# Patient Record
Sex: FEMALE | Race: WHITE | Employment: OTHER | URBAN - METROPOLITAN AREA
[De-identification: names, ages, dates, MRNs, and addresses within clinical notes are randomized per-mention and may not be internally consistent; named-entity substitution may affect disease eponyms.]

---

## 2017-05-25 PROBLEM — M79.2 NEUROPATHIC PAIN: Chronic | Status: ACTIVE | Noted: 2017-05-25

## 2017-05-25 PROBLEM — G89.29 CHRONIC PAIN: Chronic | Status: ACTIVE | Noted: 2017-05-25

## 2017-05-25 PROBLEM — M48.02 NEURAL FORAMINAL STENOSIS OF CERVICAL SPINE: Chronic | Status: ACTIVE | Noted: 2017-05-25

## 2017-05-25 PROBLEM — M40.203 CERVICOTHORACIC KYPHOSIS: Chronic | Status: ACTIVE | Noted: 2017-05-25

## 2018-03-23 ENCOUNTER — TELEPHONE (OUTPATIENT)
Dept: NON INVASIVE DIAGNOSTICS | Age: 83
End: 2018-03-23

## 2018-04-17 ENCOUNTER — TELEPHONE (OUTPATIENT)
Dept: NON INVASIVE DIAGNOSTICS | Age: 83
End: 2018-04-17

## 2018-11-22 ENCOUNTER — HOSPITAL ENCOUNTER (INPATIENT)
Age: 83
LOS: 3 days | Discharge: HOME | DRG: 291 | End: 2018-11-25
Attending: INTERNAL MEDICINE | Admitting: INTERNAL MEDICINE
Payer: MEDICARE

## 2018-11-22 ENCOUNTER — APPOINTMENT (OUTPATIENT)
Dept: GENERAL RADIOLOGY | Age: 83
DRG: 291 | End: 2018-11-22
Attending: INTERNAL MEDICINE
Payer: MEDICARE

## 2018-11-22 PROBLEM — I50.43 CHF (CONGESTIVE HEART FAILURE), NYHA CLASS I, ACUTE ON CHRONIC, COMBINED (HCC): Status: ACTIVE | Noted: 2018-11-22

## 2018-11-22 LAB
ANION GAP SERPL CALCULATED.3IONS-SCNC: 18 MMOL/L (ref 7–16)
BACTERIA: NORMAL /HPF
BASOPHILS ABSOLUTE: 0 E9/L (ref 0–0.2)
BASOPHILS RELATIVE PERCENT: 0.2 % (ref 0–2)
BILIRUBIN URINE: NEGATIVE
BLOOD, URINE: ABNORMAL
BUN BLDV-MCNC: 41 MG/DL (ref 8–23)
CALCIUM SERPL-MCNC: 8.2 MG/DL (ref 8.6–10.2)
CHLORIDE BLD-SCNC: 96 MMOL/L (ref 98–107)
CLARITY: CLEAR
CO2: 18 MMOL/L (ref 22–29)
COLOR: YELLOW
CREAT SERPL-MCNC: 2.1 MG/DL (ref 0.5–1)
DOHLE BODIES: ABNORMAL
EOSINOPHILS ABSOLUTE: 0 E9/L (ref 0.05–0.5)
EOSINOPHILS RELATIVE PERCENT: 0 % (ref 0–6)
GFR AFRICAN AMERICAN: 27
GFR NON-AFRICAN AMERICAN: 22 ML/MIN/1.73
GLUCOSE BLD-MCNC: 178 MG/DL (ref 74–99)
GLUCOSE URINE: NEGATIVE MG/DL
HCT VFR BLD CALC: 35.9 % (ref 34–48)
HEMOGLOBIN: 11.9 G/DL (ref 11.5–15.5)
KETONES, URINE: NEGATIVE MG/DL
LEUKOCYTE ESTERASE, URINE: ABNORMAL
LYMPHOCYTES ABSOLUTE: 0.87 E9/L (ref 1.5–4)
LYMPHOCYTES RELATIVE PERCENT: 2.6 % (ref 20–42)
MCH RBC QN AUTO: 30.6 PG (ref 26–35)
MCHC RBC AUTO-ENTMCNC: 33.1 % (ref 32–34.5)
MCV RBC AUTO: 92.3 FL (ref 80–99.9)
METAMYELOCYTES RELATIVE PERCENT: 0.9 % (ref 0–1)
MONOCYTES ABSOLUTE: 0.29 E9/L (ref 0.1–0.95)
MONOCYTES RELATIVE PERCENT: 0.9 % (ref 2–12)
NEUTROPHILS ABSOLUTE: 28.03 E9/L (ref 1.8–7.3)
NEUTROPHILS RELATIVE PERCENT: 95.6 % (ref 43–80)
NITRITE, URINE: NEGATIVE
PDW BLD-RTO: 13.6 FL (ref 11.5–15)
PH UA: 5.5 (ref 5–9)
PLATELET # BLD: 193 E9/L (ref 130–450)
PMV BLD AUTO: 10.4 FL (ref 7–12)
POTASSIUM SERPL-SCNC: 4.9 MMOL/L (ref 3.5–5)
PRO-BNP: ABNORMAL PG/ML (ref 0–450)
PROTEIN UA: ABNORMAL MG/DL
RBC # BLD: 3.89 E12/L (ref 3.5–5.5)
RBC UA: NORMAL /HPF (ref 0–2)
SODIUM BLD-SCNC: 132 MMOL/L (ref 132–146)
SPECIFIC GRAVITY UA: 1.01 (ref 1–1.03)
UROBILINOGEN, URINE: 0.2 E.U./DL
WBC # BLD: 28.9 E9/L (ref 4.5–11.5)
WBC UA: NORMAL /HPF (ref 0–5)

## 2018-11-22 PROCEDURE — 85025 COMPLETE CBC W/AUTO DIFF WBC: CPT

## 2018-11-22 PROCEDURE — 81001 URINALYSIS AUTO W/SCOPE: CPT

## 2018-11-22 PROCEDURE — 6370000000 HC RX 637 (ALT 250 FOR IP): Performed by: FAMILY MEDICINE

## 2018-11-22 PROCEDURE — 2700000000 HC OXYGEN THERAPY PER DAY

## 2018-11-22 PROCEDURE — 87040 BLOOD CULTURE FOR BACTERIA: CPT

## 2018-11-22 PROCEDURE — 36415 COLL VENOUS BLD VENIPUNCTURE: CPT

## 2018-11-22 PROCEDURE — 87088 URINE BACTERIA CULTURE: CPT

## 2018-11-22 PROCEDURE — 83880 ASSAY OF NATRIURETIC PEPTIDE: CPT

## 2018-11-22 PROCEDURE — 71046 X-RAY EXAM CHEST 2 VIEWS: CPT

## 2018-11-22 PROCEDURE — 84145 PROCALCITONIN (PCT): CPT

## 2018-11-22 PROCEDURE — 1200000000 HC SEMI PRIVATE

## 2018-11-22 PROCEDURE — 80048 BASIC METABOLIC PNL TOTAL CA: CPT

## 2018-11-22 RX ORDER — IPRATROPIUM BROMIDE AND ALBUTEROL SULFATE 2.5; .5 MG/3ML; MG/3ML
1 SOLUTION RESPIRATORY (INHALATION)
Status: DISCONTINUED | OUTPATIENT
Start: 2018-11-22 | End: 2018-11-25 | Stop reason: HOSPADM

## 2018-11-22 RX ORDER — ZOLPIDEM TARTRATE 5 MG/1
5 TABLET ORAL NIGHTLY PRN
Status: DISCONTINUED | OUTPATIENT
Start: 2018-11-22 | End: 2018-11-25 | Stop reason: HOSPADM

## 2018-11-22 RX ORDER — SIMVASTATIN 10 MG
10 TABLET ORAL DAILY
Status: DISCONTINUED | OUTPATIENT
Start: 2018-11-23 | End: 2018-11-25 | Stop reason: HOSPADM

## 2018-11-22 RX ORDER — ESOMEPRAZOLE MAGNESIUM 20 MG/1
20 FOR SUSPENSION ORAL DAILY
COMMUNITY

## 2018-11-22 RX ORDER — PREGABALIN 50 MG/1
50 CAPSULE ORAL 3 TIMES DAILY
Status: DISCONTINUED | OUTPATIENT
Start: 2018-11-22 | End: 2018-11-25 | Stop reason: HOSPADM

## 2018-11-22 RX ORDER — ACETAMINOPHEN 325 MG/1
650 TABLET ORAL EVERY 4 HOURS PRN
Status: DISCONTINUED | OUTPATIENT
Start: 2018-11-22 | End: 2018-11-25 | Stop reason: HOSPADM

## 2018-11-22 RX ORDER — PREGABALIN 50 MG/1
50 CAPSULE ORAL 3 TIMES DAILY
COMMUNITY

## 2018-11-22 RX ORDER — LEVOTHYROXINE SODIUM 88 UG/1
88 TABLET ORAL DAILY
Status: DISCONTINUED | OUTPATIENT
Start: 2018-11-23 | End: 2018-11-25 | Stop reason: HOSPADM

## 2018-11-22 RX ORDER — LANOLIN ALCOHOL/MO/W.PET/CERES
3 CREAM (GRAM) TOPICAL NIGHTLY PRN
Status: DISCONTINUED | OUTPATIENT
Start: 2018-11-22 | End: 2018-11-25 | Stop reason: HOSPADM

## 2018-11-22 RX ORDER — ESOMEPRAZOLE MAGNESIUM 20 MG/1
20 FOR SUSPENSION ORAL DAILY
Status: DISCONTINUED | OUTPATIENT
Start: 2018-11-23 | End: 2018-11-25 | Stop reason: HOSPADM

## 2018-11-22 RX ADMIN — PREGABALIN 50 MG: 50 CAPSULE ORAL at 20:39

## 2018-11-22 RX ADMIN — ZOLPIDEM TARTRATE 5 MG: 5 TABLET ORAL at 20:38

## 2018-11-22 ASSESSMENT — PAIN SCALES - GENERAL
PAINLEVEL_OUTOF10: 0

## 2018-11-22 NOTE — H&P
INTERNAL MEDICINE H&P  2018  Name:  Tanika Grover  :   1935 (5 y.o.)  MRN:   [id number]    No chief complaint on file. HPI:  The patient is a 80 y.o. female presents with generalized weakness, SOB and fatigue which has been present for the past 3 days. She lives at home with her daughter and states that she has been spending most of the day in the bed. She admits to arthritic pain. She denies any chest pain, abdominal pain, nausea, vomiting, fever, chills, dysuria, diarrhea, recent antibiotic use, recent steroid use. She does not use home oxygen. She cannot give me very many details as to why she came to the hospital.    She smokes 1 PPD. ECHO 2016   Limited study ordered:   Left ventricular size is grossly normal.   Moderate left ventricular concentric hypertrophy noted.   Abnormal (paradoxical) motion consistent with conduction abnormality.   Ejection fraction is visually estimated at 35 to 40%. LAST DISCHARGE DIAGNOSES 10/20/2016:   1. Acute systolic congestive heart failure   2. Chronic kidney disease stage 3   3. COPD   4. Hypertension   5. Hypothyroidism   6. Peripheral vascular disease   7.     Anemia of chronic disease    Past Medical History:    Past Medical History:   Diagnosis Date    CKD (chronic kidney disease), stage III     Complete heart block (HCC)     [facility] [address]     Depression     Hypertension     Hypothyroidism 2012    Left ventricular systolic dysfunction     PVD (peripheral vascular disease) with claudication ([facility] [address] 2014    RBBB (right bundle branch block with left anterior fascicular block)     Valvular heart disease        Past Surgical History:   Past Surgical History:   Procedure Laterality Date    CARDIAC CATHETERIZATION  09/10/2012    Dr. Roman Big EF 50%    COLONOSCOPY      CORONARY ANGIOPLASTY WITH STENT PLACEMENT  09/10/2012    Dr. Nay More LIZZY to PDA x1    ECHO COMPL W DOP COLOR FLOW  9/8/2012         ENDOSCOPY, COLON, DIAGNOSTIC      OTHER SURGICAL HISTORY  12/16/15    PGR by Dr. Jenny Tuttle  12/16/2015    TUBAL LIGATION  1963       Social History:  Social History     Social History    Marital status:      Spouse name: N/A    Number of children: N/A    Years of education: N/A     Social History Main Topics    Smoking status: Current Every Day Smoker     Packs/day: 1.00     Years: 25.00     Types: Cigarettes    Smokeless tobacco: Never Used    Alcohol use No    Drug use: No    Sexual activity: Not on file     Other Topics Concern    Not on file     Social History Narrative    No narrative on file       Family History:   Family History   Problem Relation Age of Onset    Cancer Mother         uterine    No Known Problems Father        Allergies:    No Known Allergies    Medications Prior to Admission:   Prior to Admission medications    Medication Sig Start Date End Date Taking? Authorizing Provider   pregabalin (LYRICA) 75 MG capsule Take 1 capsule by mouth 3 times daily 6/20/17   ZAHRAA Anderson CNP   lidocaine (XYLOCAINE) 5 % ointment Apply topically as needed.  5/25/17   Wil Werner,    isosorbide mononitrate (IMDUR) 30 MG extended release tablet Take 1 tablet by mouth daily 10/20/16   Nereida Plaster, DO   carvedilol (COREG) 3.125 MG tablet Take 1 tablet by mouth 2 times daily (with meals) 10/20/16   Nereida Plaster, DO   spironolactone (ALDACTONE) 25 MG tablet Take 1 tablet by mouth daily 10/20/16   Nereida Plaster, DO   zolpidem (AMBIEN) 5 MG tablet Take 1 tablet by mouth at bedtime 9/9/16   Historical Provider, MD   furosemide (LASIX) 40 MG tablet Take 1 tablet by mouth 2 times daily 10/4/16   ZAHRAA Diez CNP   cyanocobalamin 1000 MCG/ML injection Inject 1,000 mcg into the muscle every 30 days     Historical Provider, MD   gabapentin (NEURONTIN) 300 MG capsule Take 1 capsule by mouth nightly  Patient taking differently: Take 300 mg by mouth 3 times daily  9/15/15   Juan Pablo Bravo MD   simvastatin (ZOCOR) 10 MG tablet Take 10 mg by mouth nightly  7/8/15   Historical Provider, MD   levothyroxine (SYNTHROID) 88 MCG tablet Take 88 mcg by mouth  3/27/15   Historical Provider, MD   ADVAIR DISKUS 250-50 MCG/DOSE AEPB Inhale 1 puff into the lungs as needed  4/2/13   Historical Provider, MD   NITROSTAT 0.4 MG SL tablet Place 0.4 mg under the tongue every 5 minutes as needed. 1/3/13   Historical Provider, MD       REVIEW OF SYSTEMS:  General ROS: positive for  - fatigue  Hematological and Lymphatic ROS: negative  Respiratory ROS: positive for - shortness of breath  Cardiovascular ROS: positive for - shortness of breath  Gastrointestinal ROS: negative  Genito-Urinary ROS: negative  Musculoskeletal ROS: negative  ENT ROS: negative  Endocrine ROS: negative  Dermatological ROS: negative    PHYSICAL EXAM:  There were no vitals filed for this visit. General Appearance:  awake, alert, oriented, in no acute distress  Skin:  Skin color, texture, turgor normal. No rashes or lesions. Head/face:  NCAT  Eyes:  No gross abnormalities.   Lungs:  3L NC, no cough, CTAB, mild bilateral rhonchi, mild exp wheezes  Heart: pacer, RR, no JVD, +peripheral edema  Abdomen:  S, NT, ND  Extremities: BLE edema, no erythema, non tender  Neurologic: CNs grossly intact, no slurred speech, alert, muffled speech  Musculoskeletal: good ROM, no acute deformities      CBC:   Lab Results   Component Value Date    WBC 7.0 10/20/2016    RBC 3.92 10/20/2016    HGB 9.6 10/20/2016    HCT 30.0 10/20/2016    MCV 76.6 10/20/2016    MCH 24.4 10/20/2016    MCHC 31.9 10/20/2016    RDW 17.8 10/20/2016     10/20/2016    MPV 7.8 10/20/2016     BMP:   Lab Results   Component Value Date     10/20/2016    K 4.0 10/20/2016    CL 98 10/20/2016    CO2 28 10/20/2016    BUN 34 10/20/2016    LABALBU 3.8 10/20/2016    LABALBU 4.1 07/31/2011    CREATININE 1.2 10/20/2016 CALCIUM 9.4 10/20/2016    GFRAA 52 10/20/2016    LABGLOM 43 10/20/2016    GLUCOSE 138 10/20/2016    GLUCOSE 197 07/31/2011     Hepatic Function Panel:    Lab Results   Component Value Date    ALKPHOS 68 10/20/2016    AST 20 10/20/2016    ALT 19 10/20/2016    PROT 6.6 10/20/2016    LABALBU 3.8 10/20/2016    LABALBU 4.1 07/31/2011    BILITOT 0.2 10/20/2016     PT/INR:   Lab Results   Component Value Date    PROTIME 11.3 09/26/2012    INR 1.2 09/26/2012     U/A:   Lab Results   Component Value Date    LEUKOCYTESUR TRACE 02/05/2016    PHUR 6.0 02/05/2016    WBCUA 0-1 02/05/2016    WBCUA 0-1 07/30/2011    RBCUA NONE 02/05/2016    RBCUA NONE 07/30/2011    BACTERIA MODERATE 02/05/2016    SPECGRAV 1.010 02/05/2016    BLOODU Negative 02/05/2016    GLUCOSEU Negative 02/05/2016    GLUCOSEU NEGATIVE 07/30/2011     TSH:    Lab Results   Component Value Date    TSH 0.815 10/18/2016     Xr Chest Standard (2 Vw)    Result Date: 11/22/2018  LOCATION: 200 EXAM: XR CHEST (2 VW) COMPARISON: None HISTORY: Shortness of breath TECHNIQUE: PA and lateral  views of the chest were obtained. FINDINGS:  SUPPORT DEVICES: None. LUNGS: Mildly prominent interstitial markings identified. PLEURA: No effusions or pneumothorax. LUNG VOLUMES: Satisfactory inspirator effort. MEDIASTINAL STRUCTURES: No lymphadenopathy. Normal aortic contour. HEART SIZE: Enlarged. UPPER ABDOMEN: Unremarkable. BONES AND SOFT TISSUES: No fracture or soft tissue abnormality. Prominent interstitial markings likely interstitial edema. Assessment and Plan:  Acute on chronic systolic congestive heart failure  Chronic kidney disease stage III  COPD the  Hypertension  Hypothyroidism  Peripheral vascular disease  Anemia of chronic disease        Direct admitted to telemetry  Labs now and chest x-ray now  Morning labs  Cardiac diet  Up With assistance  PT, OT  Flutter  Home medications reviewed  DVT/GI prophylaxis  Will discuss with Attending Physician.     Shyann Kearns Synthia Lombard, DO 5:13 PM, 11/22/2018     I discussed the patient's management with the resident. I reviewed the resident's note and agree with the documented findings and plan of care.     Jarocho Emery D.O., [facility]  11/22/2018  6:00 PM

## 2018-11-23 PROBLEM — I50.21 ACUTE SYSTOLIC CHF (CONGESTIVE HEART FAILURE) (HCC): Status: ACTIVE | Noted: 2018-11-23

## 2018-11-23 LAB
ACANTHOCYTES: ABNORMAL
ALBUMIN SERPL-MCNC: 3.3 G/DL (ref 3.5–5.2)
ALP BLD-CCNC: 55 U/L (ref 35–104)
ALT SERPL-CCNC: 18 U/L (ref 0–32)
ANION GAP SERPL CALCULATED.3IONS-SCNC: 18 MMOL/L (ref 7–16)
AST SERPL-CCNC: 15 U/L (ref 0–31)
BASOPHILS ABSOLUTE: 0 E9/L (ref 0–0.2)
BASOPHILS RELATIVE PERCENT: 0.4 % (ref 0–2)
BILIRUB SERPL-MCNC: 0.3 MG/DL (ref 0–1.2)
BUN BLDV-MCNC: 45 MG/DL (ref 8–23)
BURR CELLS: ABNORMAL
CALCIUM SERPL-MCNC: 8.4 MG/DL (ref 8.6–10.2)
CHLORIDE BLD-SCNC: 98 MMOL/L (ref 98–107)
CHOLESTEROL, TOTAL: 158 MG/DL (ref 0–199)
CO2: 19 MMOL/L (ref 22–29)
CREAT SERPL-MCNC: 1.9 MG/DL (ref 0.5–1)
EOSINOPHILS ABSOLUTE: 0 E9/L (ref 0.05–0.5)
EOSINOPHILS RELATIVE PERCENT: 0 % (ref 0–6)
GFR AFRICAN AMERICAN: 31
GFR NON-AFRICAN AMERICAN: 25 ML/MIN/1.73
GLUCOSE BLD-MCNC: 164 MG/DL (ref 74–99)
HCT VFR BLD CALC: 37 % (ref 34–48)
HDLC SERPL-MCNC: 38 MG/DL
HEMOGLOBIN: 11.9 G/DL (ref 11.5–15.5)
LDL CHOLESTEROL CALCULATED: 93 MG/DL (ref 0–99)
LYMPHOCYTES ABSOLUTE: 0.84 E9/L (ref 1.5–4)
LYMPHOCYTES RELATIVE PERCENT: 2.6 % (ref 20–42)
MCH RBC QN AUTO: 30.2 PG (ref 26–35)
MCHC RBC AUTO-ENTMCNC: 32.2 % (ref 32–34.5)
MCV RBC AUTO: 93.9 FL (ref 80–99.9)
MONOCYTES ABSOLUTE: 0.28 E9/L (ref 0.1–0.95)
MONOCYTES RELATIVE PERCENT: 0.9 % (ref 2–12)
NEUTROPHILS ABSOLUTE: 27.26 E9/L (ref 1.8–7.3)
NEUTROPHILS RELATIVE PERCENT: 96.5 % (ref 43–80)
PDW BLD-RTO: 13.7 FL (ref 11.5–15)
PLATELET # BLD: 200 E9/L (ref 130–450)
PMV BLD AUTO: 10.8 FL (ref 7–12)
POIKILOCYTES: ABNORMAL
POTASSIUM SERPL-SCNC: 4.5 MMOL/L (ref 3.5–5)
PROCALCITONIN: 7.34 NG/ML (ref 0–0.08)
RBC # BLD: 3.94 E12/L (ref 3.5–5.5)
SODIUM BLD-SCNC: 135 MMOL/L (ref 132–146)
TOTAL PROTEIN: 6.8 G/DL (ref 6.4–8.3)
TRIGL SERPL-MCNC: 134 MG/DL (ref 0–149)
TSH SERPL DL<=0.05 MIU/L-ACNC: 0.39 UIU/ML (ref 0.27–4.2)
VLDLC SERPL CALC-MCNC: 27 MG/DL
WBC # BLD: 28.1 E9/L (ref 4.5–11.5)

## 2018-11-23 PROCEDURE — 80053 COMPREHEN METABOLIC PANEL: CPT

## 2018-11-23 PROCEDURE — 97161 PT EVAL LOW COMPLEX 20 MIN: CPT | Performed by: PHYSICAL THERAPIST

## 2018-11-23 PROCEDURE — 80061 LIPID PANEL: CPT

## 2018-11-23 PROCEDURE — 6370000000 HC RX 637 (ALT 250 FOR IP): Performed by: FAMILY MEDICINE

## 2018-11-23 PROCEDURE — 2580000003 HC RX 258: Performed by: INTERNAL MEDICINE

## 2018-11-23 PROCEDURE — 97535 SELF CARE MNGMENT TRAINING: CPT

## 2018-11-23 PROCEDURE — G8987 SELF CARE CURRENT STATUS: HCPCS

## 2018-11-23 PROCEDURE — 1200000000 HC SEMI PRIVATE

## 2018-11-23 PROCEDURE — 6360000002 HC RX W HCPCS: Performed by: INTERNAL MEDICINE

## 2018-11-23 PROCEDURE — 2700000000 HC OXYGEN THERAPY PER DAY

## 2018-11-23 PROCEDURE — 97166 OT EVAL MOD COMPLEX 45 MIN: CPT

## 2018-11-23 PROCEDURE — G8988 SELF CARE GOAL STATUS: HCPCS

## 2018-11-23 PROCEDURE — 97530 THERAPEUTIC ACTIVITIES: CPT | Performed by: PHYSICAL THERAPIST

## 2018-11-23 PROCEDURE — 85025 COMPLETE CBC W/AUTO DIFF WBC: CPT

## 2018-11-23 PROCEDURE — 36415 COLL VENOUS BLD VENIPUNCTURE: CPT

## 2018-11-23 PROCEDURE — G8978 MOBILITY CURRENT STATUS: HCPCS | Performed by: PHYSICAL THERAPIST

## 2018-11-23 PROCEDURE — 94640 AIRWAY INHALATION TREATMENT: CPT

## 2018-11-23 PROCEDURE — G8979 MOBILITY GOAL STATUS: HCPCS | Performed by: PHYSICAL THERAPIST

## 2018-11-23 PROCEDURE — 84443 ASSAY THYROID STIM HORMONE: CPT

## 2018-11-23 RX ADMIN — ACETAMINOPHEN 650 MG: 325 TABLET, FILM COATED ORAL at 17:28

## 2018-11-23 RX ADMIN — PREGABALIN 50 MG: 50 CAPSULE ORAL at 20:37

## 2018-11-23 RX ADMIN — PREGABALIN 50 MG: 50 CAPSULE ORAL at 15:03

## 2018-11-23 RX ADMIN — IPRATROPIUM BROMIDE AND ALBUTEROL SULFATE 1 AMPULE: .5; 3 SOLUTION RESPIRATORY (INHALATION) at 20:03

## 2018-11-23 RX ADMIN — ESOMEPRAZOLE MAGNESIUM 20 MG: 20 GRANULE, DELAYED RELEASE ORAL at 08:38

## 2018-11-23 RX ADMIN — PREGABALIN 50 MG: 50 CAPSULE ORAL at 08:38

## 2018-11-23 RX ADMIN — CEFTRIAXONE SODIUM 1 G: 1 INJECTION, POWDER, FOR SOLUTION INTRAMUSCULAR; INTRAVENOUS at 17:21

## 2018-11-23 RX ADMIN — IPRATROPIUM BROMIDE AND ALBUTEROL SULFATE 1 AMPULE: .5; 3 SOLUTION RESPIRATORY (INHALATION) at 06:55

## 2018-11-23 RX ADMIN — IPRATROPIUM BROMIDE AND ALBUTEROL SULFATE 1 AMPULE: .5; 3 SOLUTION RESPIRATORY (INHALATION) at 14:19

## 2018-11-23 RX ADMIN — ZOLPIDEM TARTRATE 5 MG: 5 TABLET ORAL at 21:50

## 2018-11-23 RX ADMIN — IPRATROPIUM BROMIDE AND ALBUTEROL SULFATE 1 AMPULE: .5; 3 SOLUTION RESPIRATORY (INHALATION) at 10:24

## 2018-11-23 RX ADMIN — SIMVASTATIN 10 MG: 10 TABLET, FILM COATED ORAL at 08:38

## 2018-11-23 RX ADMIN — LEVOTHYROXINE SODIUM 88 MCG: 88 TABLET ORAL at 05:33

## 2018-11-23 ASSESSMENT — PAIN SCALES - GENERAL
PAINLEVEL_OUTOF10: 0

## 2018-11-23 NOTE — PROGRESS NOTES
Room #:   0813/1574-61  Patient Name: Tyler Goldberg    PHYSICAL THERAPY INITIAL EVALUATION    Tentative placement recommendation: HHPT if goals met  Equipment recommendation: shower chair ? Plan of care: Patient will be seen   daily Monday-Friday,  for therapeutic exercise, functional retraining, endurance activities, balance exercises, family and patient education. AM-PAC Basic Mobility        AM-[facility] Mobility Inpatient   How much difficulty turning over in bed?: A Little  How much difficulty sitting down on / standing up from a chair with arms?: A Little  How much difficulty moving from lying on back to sitting on side of bed?: A Little  How much help from another person moving to and from a bed to a chair?: A Little  How much help from another person needed to walk in hospital room?: A Little  How much help from another person for climbing 3-5 steps with a railing?: A Lot  AM-PAC Inpatient Mobility Raw Score : 17  AM-PAC Inpatient T-Scale Score : 42.13  Mobility Inpatient CMS 0-100% Score: 50.57  Mobility Inpatient CMS G-Code Modifier : CK    Order:  EVALUATE AND TREAT    Diagnosis/Problem list:   No diagnosis found.     Patient Active Problem List   Diagnosis    Complete heart block (HCC)    RBBB (right bundle branch block)    Hypothyroidism    Hypertension    DJD (degenerative joint disease)    Right groin pain    Tobacco abuse    Chronic obstructive pulmonary disease ([facility] [address]    CAD (coronary artery disease) - s/p PCI - LIZZY - RCA    PVD (peripheral vascular disease) with claudication (HCC)    Pain in both feet    Swelling of lower limb    Varicose veins of both lower extremities    Venous insufficiency of leg    Hyperlipidemia    SOB (shortness of breath)    Stented coronary artery    Pacemaker end of life    Pacemaker    Senile cataract    Fatigue    Acute congestive heart failure (HCC)    Chronic pain    \    Cervicothoracic kyphosis    Neural foraminal stenosis of Minimal assists cues for upright and safety      Steps:  No steps      Balance: sit-good         stand fair  flexed posture    Edema: no  Endurance: fair       Treatment: pt sat eob x 10 min, amb in hallway, assisted up to chair with OT present for bath,    with   alarm and call light, nursing notified  Rehab Potential: good     Patients Goal: home    ASSESSMENT  Patient exhibits decreased strength, balance, coordination impairing functional mobility. GOALS to be met in 3 days. Bed mobility-  Independent        Transfers-Sit to stand-Modified Independent       Gait:  Patient to ambulate 100 feet using Wheeled Walker with Modified Independent       Steps: Patient to go up and down 3  step(s) using 1 rail(s) with  Supervision       Increase rom in affected joints by 10%  Increase strength in affected mm groups by 1/3 grade  Increase balance to allow for improvement towards functional goals. Increase endurance to allow for improvement towards functional goals.         Sumit Max, PT

## 2018-11-23 NOTE — PROGRESS NOTES
Occupational Therapy  Occupational Therapy Initial Assessment    Date:2018  Patient Name: Allison Ling  MRN: [id number]  : 1935  ROOM #: 1589/9037-56    Placement recommendation: subacute   Equipment prescriptions needed:     AM-PAC Daily Activity Inpatient   How much help for putting on and taking off regular lower body clothing?: A Lot  How much help for Bathing?: A Lot  How much help for Toileting?: A Little  How much help for putting on and taking off regular upper body clothing?: A Little  How much help for taking care of personal grooming?: A Little  How much help for eating meals?: None  AM-PAC Inpatient Daily Activity Raw Score: 17  AM-PAC Inpatient ADL T-Scale Score : 37.26  ADL Inpatient CMS 0-100% Score: 50.11  ADL Inpatient CMS G-Code Modifier : CK    Diagnosis / Problem List: No diagnosis found.    Patient Active Problem List   Diagnosis    Complete heart block (HCC)    RBBB (right bundle branch block)    Hypothyroidism    Hypertension    DJD (degenerative joint disease)    Right groin pain    Tobacco abuse    Chronic obstructive pulmonary disease ([facility] [address]    CAD (coronary artery disease) - s/p PCI - LIZZY - RCA    PVD (peripheral vascular disease) with claudication (HCC)    Pain in both feet    Swelling of lower limb    Varicose veins of both lower extremities    Venous insufficiency of leg    Hyperlipidemia    SOB (shortness of breath)    Stented coronary artery    Pacemaker end of life    Pacemaker    Senile cataract    Fatigue    Acute congestive heart failure (HCC)    Chronic pain    \    Cervicothoracic kyphosis    Neural foraminal stenosis of cervical spine    CHF (congestive heart failure), NYHA class I, acute on chronic, combined ([address]      Past Medical History:   Past Medical History:   Diagnosis Date    CKD (chronic kidney disease), stage III     Complete heart block (HCC)     COPD (chronic obstructive pulmonary disease) ([address]     Depression     Hypertension     Hypothyroidism 9/8/2012    Left ventricular systolic dysfunction     PVD (peripheral vascular disease) with claudication ([facility] [address] 4/24/2014    RBBB (right bundle branch block with left anterior fascicular block)     Valvular heart disease          The admitting diagnosis and active problem list as listed above have been reviewed prior to the initiation of this evaluation. Nursing cleared the patient for evaluation. Patient is agreeable to evaluation.     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      [address]: cleaning house   Home architecture: mobile home  2 step entry with railing , laundry in the home , walk in shower   PLOF: independent with BADL with supervision  and IADL is shared   Equipment owned: bedside commode, straight cane and walker , shower seat    Cognition: alert, oriented x 3 and follows 3 step directions    good  Problem solving skills    good  Memory     good  Sequencing    Communication: intact   Visual perceptual skills: intact     Glasses: yes  Edema: no  Sensation: intact  Hand Dominance:  Left X Right     Left Right Comment   Passive range of motion [facility]  WFL     Active range of motion [facility]     Muscle Grade 4/5 4/5    /pinch Strength Good  Good         Function Assessment    Status  Goal  Comment    Feeding:  Independent  Independent     Grooming:  Independent  Independent    UE dressing:   Minimal Assist  Independent    LE dressing:  Minimal Assist  Independent    Bathing:  Minimal Assist  Independent    Bed Mobility: Sitting EOB  Independent    Functional Transfers:    Minimal Assist for sit to stand transfers Independent Safety: good        Functional Mobility: 10 feet with walker and  Minimal Assist , LOB when backing up  Supervision    Balance:   Sitting: good    Standing: fair    Endurance: fair   Patients Goal: home with daughter    Treatment:bathed from seated position and mod assist back bottom and legs , walked short distance and SOB 02 , 02 92  % after exertion and 95 after training with breathing technique , tired from walking and activity , daughter in to visit \" we don't need any therapy \" , sitting in chair tolerating well Chair alarm ON   The following skilled interventions were introduced during initial assessment:  bathing and dressing retraining, breathing technique strategies and safety/fall prevention technique  Assessment Summary: Performance Deficiencies include:  Decreased functional mobility:  generalized weakness  Decreased endurance:  fair stability/endurance  Decreased ADL status:  requires assistance bathing  Decreased safety awareness:  requires verbal cues for safe performance    Rehab Potential: good   Patient and/or family understands diagnosis, prognosis and plan of care: yes   Plan of care: Patient will be seen by OT 1-3 times per week  for therapeutic activity, bed mobility, functional transfers, functional mobility, safety, fall prevention, ADL re-training, balance and endurance activities,instruction and training in energy conservation principles, family/patient education until discharged. Time In: 1000   Time Code treatment minutes: 30    · Medium Complexity  · History: Expanded review of medical records and additional review of physical, cognitive, or psychosocial history related to current functional performance  · Exam: 3-5 performance deficits  · Assistance/Modification: Min/mod assistance or modifications required to perform tasks. May have comorbidities that affect occupational performance.     ·   Electronically signed by Vicenta Patel OT on 11/23/2018 at 10:29 AM    Vicenta Patel OTR/L#[id number]

## 2018-11-23 NOTE — PLAN OF CARE
Problem: Falls - Risk of:  Goal: Will remain free from falls    Will remain free from falls   Outcome: Met This Shift    Goal: Absence of physical injury    Absence of physical injury   Outcome: Met This Shift      Problem: Injury - Risk of, Physical Injury:  Goal: Absence of physical injury    Absence of physical injury   Outcome: Met This Shift    Goal: Will remain free from falls    Will remain free from falls   Outcome: Met This Shift

## 2018-11-23 NOTE — CARE COORDINATION
Ss note:11/23/2018.12:27 PM met with pt, pt has a telesitter. Pt reports she resides with her dtr Jesusita and her son Heron Fox in mobile home in [place]. There are 3 steps to enter. Pt relays she has someone in the home with her at all times. Pt does NOT wear home oxygen. Hx of [facility]. Plan at this time is home with family, will need [facility] order. [facility] to obtain medications.  JOSEF Lindsay

## 2018-11-23 NOTE — PROGRESS NOTES
Voiding without difficulty. Musculoskeletal:  Negative for myalgias, back pain and arthralgias      Allergy:  No Known Allergies     Medication:  Scheduled Meds:   esomeprazole Magnesium  20 mg Oral Daily    levothyroxine  88 mcg Oral Daily    pregabalin  50 mg Oral TID    simvastatin  10 mg Oral Daily    ipratropium-albuterol  1 ampule Inhalation Q4H WA     Continuous Infusions:    Objective Data:  CBC:   Recent Labs      11/22/18 1912 11/23/18 0622   WBC  28.9*  28.1*   HGB  11.9  11.9   PLT  193  200     BMP:  Recent Labs      11/22/18 1912  11/23/18 0622   NA  132  135   K  4.9  4.5   CL  96*  98   CO2  18*  19*   BUN  41*  45*   CREATININE  2.1*  1.9*   GLUCOSE  178*  164*     CMP:  Lab Results   Component Value Date     11/23/2018    K 4.5 11/23/2018    CL 98 11/23/2018    CO2 19 11/23/2018    BUN 45 11/23/2018    CREATININE 1.9 11/23/2018    GFRAA 31 11/23/2018    LABGLOM 25 11/23/2018    GLUCOSE 164 11/23/2018    GLUCOSE 197 07/31/2011    PROT 6.8 11/23/2018    LABALBU 3.3 11/23/2018    LABALBU 4.1 07/31/2011    CALCIUM 8.4 11/23/2018    BILITOT 0.3 11/23/2018    ALKPHOS 55 11/23/2018    AST 15 11/23/2018    ALT 18 11/23/2018     Hepatic: Recent Labs      11/23/18 0622   AST  15   ALT  18   BILITOT  0.3   ALKPHOS  55     Troponin: No results for input(s): TROPONINI in the last 72 hours. BNP: No results for input(s): BNP in the last 72 hours. Lipids:   Recent Labs      11/23/18 0622   CHOL  158   HDL  38     ABGs: No results found for: PHART, PO2ART, PEV0MVY  INR: No results for input(s): INR, PROTIME in the last 72 hours. RAD: Xr Chest Standard (2 Vw)    Result Date: 11/22/2018  LOCATION: 200 EXAM: XR CHEST (2 VW) COMPARISON: None HISTORY: Shortness of breath TECHNIQUE: PA and lateral  views of the chest were obtained. FINDINGS:  SUPPORT DEVICES: None. LUNGS: Mildly prominent interstitial markings identified. PLEURA: No effusions or pneumothorax.  LUNG VOLUMES: Satisfactory  COPD (chronic obstructive pulmonary disease) ([facility] [address]     Depression     Hypertension     Hypothyroidism 9/8/2012    Left ventricular systolic dysfunction     PVD (peripheral vascular disease) with claudication ([facility] [address] 4/24/2014    RBBB (right bundle branch block with left anterior fascicular block)     Valvular heart disease      Active Problems:    CHF (congestive heart failure), NYHA class I, acute on chronic, combined ([facility] [address]  Resolved Problems:    * No resolved hospital problems. *      Assessment:  1. Acute on chronic systolic congestive heart failure  2. Ambulatory dysfunction-acute onset  3. Leukocytosis  4. Acute kidney injury on Chronic kidney disease stage III  5. COPD the  6. Hypertension  7. Hypothyroidism  8. Peripheral vascular disease  9. Anemia of chronic disease  10. Tobacco abuse    Plan:   Cultures are pending. Has marked leukocytosis-will further evaluate. PT/OT to evaluate and treat.  for discharge planning. Would benefit from aggressive PT. Patient's medications were reviewed/continued/adjusted. Labs as ordered. Please see orders for further plan of care. Recommended smoking cessation.  for discharge planning. More than 50% of my  time was spent counseling and/or coordination of care with the patient and/or family with face to face contact. Time was spent reviewing notes and laboratory data, instructing and counseling the patient  regarding my findings and recommendations and reviewing and answer questions. Olvin Valenzuela DO, F.A.C.O.I.   On 11/23/2018  1:29 PM

## 2018-11-24 LAB
ALBUMIN SERPL-MCNC: 3.1 G/DL (ref 3.5–5.2)
ALP BLD-CCNC: 59 U/L (ref 35–104)
ALT SERPL-CCNC: 13 U/L (ref 0–32)
ANION GAP SERPL CALCULATED.3IONS-SCNC: 15 MMOL/L (ref 7–16)
AST SERPL-CCNC: 12 U/L (ref 0–31)
BASOPHILS ABSOLUTE: 0 E9/L (ref 0–0.2)
BASOPHILS RELATIVE PERCENT: 0 % (ref 0–2)
BILIRUB SERPL-MCNC: 0.3 MG/DL (ref 0–1.2)
BUN BLDV-MCNC: 46 MG/DL (ref 8–23)
BURR CELLS: ABNORMAL
CALCIUM SERPL-MCNC: 8.3 MG/DL (ref 8.6–10.2)
CHLORIDE BLD-SCNC: 99 MMOL/L (ref 98–107)
CO2: 20 MMOL/L (ref 22–29)
CREAT SERPL-MCNC: 1.6 MG/DL (ref 0.5–1)
EOSINOPHILS ABSOLUTE: 0 E9/L (ref 0.05–0.5)
EOSINOPHILS RELATIVE PERCENT: 0 % (ref 0–6)
GFR AFRICAN AMERICAN: 37
GFR NON-AFRICAN AMERICAN: 31 ML/MIN/1.73
GLUCOSE BLD-MCNC: 114 MG/DL (ref 74–99)
HCT VFR BLD CALC: 35 % (ref 34–48)
HEMOGLOBIN: 11.5 G/DL (ref 11.5–15.5)
LV EF: 54 %
LVEF MODALITY: NORMAL
LYMPHOCYTES ABSOLUTE: 0.56 E9/L (ref 1.5–4)
LYMPHOCYTES RELATIVE PERCENT: 3 % (ref 20–42)
MCH RBC QN AUTO: 30.3 PG (ref 26–35)
MCHC RBC AUTO-ENTMCNC: 32.9 % (ref 32–34.5)
MCV RBC AUTO: 92.1 FL (ref 80–99.9)
MONOCYTES ABSOLUTE: 0.19 E9/L (ref 0.1–0.95)
MONOCYTES RELATIVE PERCENT: 1 % (ref 2–12)
NEUTROPHILS ABSOLUTE: 17.86 E9/L (ref 1.8–7.3)
NEUTROPHILS RELATIVE PERCENT: 96 % (ref 43–80)
PDW BLD-RTO: 13.8 FL (ref 11.5–15)
PLATELET # BLD: 200 E9/L (ref 130–450)
PMV BLD AUTO: 10.9 FL (ref 7–12)
POIKILOCYTES: ABNORMAL
POTASSIUM SERPL-SCNC: 3.8 MMOL/L (ref 3.5–5)
RBC # BLD: 3.8 E12/L (ref 3.5–5.5)
SODIUM BLD-SCNC: 134 MMOL/L (ref 132–146)
TOTAL PROTEIN: 6.6 G/DL (ref 6.4–8.3)
WBC # BLD: 18.6 E9/L (ref 4.5–11.5)

## 2018-11-24 PROCEDURE — 94640 AIRWAY INHALATION TREATMENT: CPT

## 2018-11-24 PROCEDURE — 6360000002 HC RX W HCPCS: Performed by: INTERNAL MEDICINE

## 2018-11-24 PROCEDURE — 6370000000 HC RX 637 (ALT 250 FOR IP): Performed by: FAMILY MEDICINE

## 2018-11-24 PROCEDURE — 99222 1ST HOSP IP/OBS MODERATE 55: CPT | Performed by: INTERNAL MEDICINE

## 2018-11-24 PROCEDURE — 2700000000 HC OXYGEN THERAPY PER DAY

## 2018-11-24 PROCEDURE — 80053 COMPREHEN METABOLIC PANEL: CPT

## 2018-11-24 PROCEDURE — 93306 TTE W/DOPPLER COMPLETE: CPT

## 2018-11-24 PROCEDURE — 6370000000 HC RX 637 (ALT 250 FOR IP): Performed by: INTERNAL MEDICINE

## 2018-11-24 PROCEDURE — 2580000003 HC RX 258: Performed by: INTERNAL MEDICINE

## 2018-11-24 PROCEDURE — 1200000000 HC SEMI PRIVATE

## 2018-11-24 PROCEDURE — 36415 COLL VENOUS BLD VENIPUNCTURE: CPT

## 2018-11-24 PROCEDURE — 85025 COMPLETE CBC W/AUTO DIFF WBC: CPT

## 2018-11-24 RX ORDER — CARVEDILOL 3.12 MG/1
3.12 TABLET ORAL 2 TIMES DAILY WITH MEALS
Status: DISCONTINUED | OUTPATIENT
Start: 2018-11-24 | End: 2018-11-25 | Stop reason: HOSPADM

## 2018-11-24 RX ORDER — ISOSORBIDE MONONITRATE 30 MG/1
30 TABLET, EXTENDED RELEASE ORAL DAILY
Status: DISCONTINUED | OUTPATIENT
Start: 2018-11-24 | End: 2018-11-25 | Stop reason: HOSPADM

## 2018-11-24 RX ORDER — FUROSEMIDE 10 MG/ML
20 INJECTION INTRAMUSCULAR; INTRAVENOUS ONCE
Status: COMPLETED | OUTPATIENT
Start: 2018-11-24 | End: 2018-11-24

## 2018-11-24 RX ORDER — FUROSEMIDE 40 MG/1
40 TABLET ORAL DAILY
Status: DISCONTINUED | OUTPATIENT
Start: 2018-11-24 | End: 2018-11-25 | Stop reason: HOSPADM

## 2018-11-24 RX ADMIN — ESOMEPRAZOLE MAGNESIUM 20 MG: 20 GRANULE, DELAYED RELEASE ORAL at 09:15

## 2018-11-24 RX ADMIN — IPRATROPIUM BROMIDE AND ALBUTEROL SULFATE 1 AMPULE: .5; 3 SOLUTION RESPIRATORY (INHALATION) at 05:56

## 2018-11-24 RX ADMIN — FUROSEMIDE 20 MG: 10 INJECTION, SOLUTION INTRAMUSCULAR; INTRAVENOUS at 12:14

## 2018-11-24 RX ADMIN — IPRATROPIUM BROMIDE AND ALBUTEROL SULFATE 1 AMPULE: .5; 3 SOLUTION RESPIRATORY (INHALATION) at 18:28

## 2018-11-24 RX ADMIN — PREGABALIN 50 MG: 50 CAPSULE ORAL at 21:05

## 2018-11-24 RX ADMIN — LEVOTHYROXINE SODIUM 88 MCG: 88 TABLET ORAL at 06:26

## 2018-11-24 RX ADMIN — CARVEDILOL 3.12 MG: 3.12 TABLET, FILM COATED ORAL at 12:14

## 2018-11-24 RX ADMIN — PREGABALIN 50 MG: 50 CAPSULE ORAL at 09:15

## 2018-11-24 RX ADMIN — IPRATROPIUM BROMIDE AND ALBUTEROL SULFATE 1 AMPULE: .5; 3 SOLUTION RESPIRATORY (INHALATION) at 13:03

## 2018-11-24 RX ADMIN — IPRATROPIUM BROMIDE AND ALBUTEROL SULFATE 1 AMPULE: .5; 3 SOLUTION RESPIRATORY (INHALATION) at 09:25

## 2018-11-24 RX ADMIN — CEFTRIAXONE SODIUM 1 G: 1 INJECTION, POWDER, FOR SOLUTION INTRAMUSCULAR; INTRAVENOUS at 15:13

## 2018-11-24 RX ADMIN — PREGABALIN 50 MG: 50 CAPSULE ORAL at 15:13

## 2018-11-24 RX ADMIN — ISOSORBIDE MONONITRATE 30 MG: 30 TABLET, EXTENDED RELEASE ORAL at 12:14

## 2018-11-24 RX ADMIN — SIMVASTATIN 10 MG: 10 TABLET, FILM COATED ORAL at 09:15

## 2018-11-24 ASSESSMENT — PAIN SCALES - GENERAL
PAINLEVEL_OUTOF10: 0

## 2018-11-24 NOTE — PROGRESS NOTES
HPI:  Jackelyn Maradiaga appears to be resting comfortably during my examination today. She was evaluated in the presence of her daughter and questions were answered accordingly. She has diuresed greater than 2 L since hospitalization and states that her respiratory status is approaching her baseline. We discussed removal of the Figueroa catheter. We also discussed discharge planning at length. White blood cell count is trending downward. Patient voiced no new complaints since hospital admission. Questions, answers, and tests reviewed. ROS:  Cardiovascular:   Denies any chest pain, irregular heartbeats, or palpitations. Respiratory:   Improving shortness of breath. No coughing or sputum production. Gastrointestinal:   Denies nausea, vomiting, diarrhea, or constipation. Denies any abdominal pain. Extremities:   Improving lower extremity edema. Neurology:    Denies any headache or focal neurological deficits. Admits to generalized weakness and deconditioning. Derm:    Denies any rashes, ulcers, or excoriations. Denies bruising. Genitourinary:    Denies any urgency, frequency, hematuria. Voiding with the use of a Figueroa catheter. Physical Exam:    Vitals:    11/24/18 0715   BP: 130/64   Pulse: 94   Resp: 20   Temp: 98.5 °F (36.9 °C)   SpO2: 97%       HEENT:  PERRLA. EOMI. Sclera clear. Buccal mucosa moist.     Neck:  Supple. Trachea midline. No thyromegaly. No JVD. No bruits. Heart:  Rhythm regular, rate controlled. Systolic murmur. Lungs:  Symmetrical. Clear to auscultation bilaterally. No wheezes. No rhonchi. No rales. Abdomen: Soft. Non-tender. Non-distended. Bowel sounds positive. No organomegaly or masses. No pain on palpation    Extremities:  Peripheral pulses present. No peripheral edema. No ulcers. Neurologic:  Alert x 3. No focal deficit. Cranial nerves grossly intact. Skin:  No petechia. No hemorrhage. No wounds.     CBC with Differential:    Lab Results   Component Value Date    WBC 18.6 11/24/2018    RBC 3.80 11/24/2018    HGB 11.5 11/24/2018    HCT 35.0 11/24/2018     11/24/2018    MCV 92.1 11/24/2018    MCH 30.3 11/24/2018    MCHC 32.9 11/24/2018    RDW 13.8 11/24/2018    SEGSPCT 53 09/27/2012    METASPCT 0.9 11/22/2018    LYMPHOPCT 3.0 11/24/2018    MONOPCT 1.0 11/24/2018    BASOPCT 0.0 11/24/2018    MONOSABS 0.19 11/24/2018    LYMPHSABS 0.56 11/24/2018    EOSABS 0.00 11/24/2018    BASOSABS 0.00 11/24/2018     BMP:    Lab Results   Component Value Date     11/24/2018    K 3.8 11/24/2018    CL 99 11/24/2018    CO2 20 11/24/2018    BUN 46 11/24/2018    LABALBU 3.1 11/24/2018    LABALBU 4.1 07/31/2011    CREATININE 1.6 11/24/2018    CALCIUM 8.3 11/24/2018    GFRAA 37 11/24/2018    LABGLOM 31 11/24/2018    GLUCOSE 114 11/24/2018    GLUCOSE 197 07/31/2011       Other Data:      Intake/Output Summary (Last 24 hours) at 11/24/18 1129  Last data filed at 11/24/18 0549   Gross per 24 hour   Intake              300 ml   Output             1400 ml   Net            -1100 ml         Scheduled Medications:   furosemide  20 mg Intravenous Once    furosemide  40 mg Oral Daily    carvedilol  3.125 mg Oral BID WC    isosorbide mononitrate  30 mg Oral Daily    cefTRIAXone (ROCEPHIN) IV  1 g Intravenous Q24H    esomeprazole Magnesium  20 mg Oral Daily    levothyroxine  88 mcg Oral Daily    pregabalin  50 mg Oral TID    simvastatin  10 mg Oral Daily    ipratropium-albuterol  1 ampule Inhalation Q4H WA       Assessment:   1. Acutely decompensated systolic congestive heart failure  2. Generalized weakness and deconditioning  3. Acute on chronic kidney disease stage III  4. Non-oxygen-dependent COPD  5. Essential hypertension  6. Hypothyroidism  7. Peripheral vascular disease  8. Anemia of chronic disease  9. Tobacco abuse    Plan:   Himanshu Trevino has diuresed accordingly and appears to be approaching a volume neutral status.  Cardiac medications will be maximized in the setting of systolic

## 2018-11-24 NOTE — CONSULTS
[facility]        CARDIOLOGY CONSULT                      PATIENT SEEN IN CONSULT FOR: CHF   Hospital Day: 3     Allison Ling is a 80year old patient follows with Dr Aubrey Bryant      History of Present Illness: The patient is a 80 y.o. female presents with generalized weakness, SOB and fatigue which has been present for the past 3 days. She lives at home with her daughter and states that she has been spending most of the day in the bed. She admits to arthritic pain. She denies any chest pain, abdominal pain, nausea, vomiting, fever, chills, dysuria, diarrhea, recent antibiotic use, recent steroid use. She does not use home oxygen. She cannot give me very many details as to why she came to the hospital. Cardiology consulted for her CHF, CAD.  She denies any CP; feeling better, no PND or orthopnea; no N/V/D           ROS: Review of rest of 10 systems negative except as mentioned above    Past Medical History:    Past Medical History        Past Medical History:   Diagnosis Date    CKD (chronic kidney disease), stage III      Complete heart block (HCC)      COPD [facility] obstructive [facility] disease) ([facility]      Depression      Hypertension      Hypothyroidism 9/8/2012    Left ventricular systolic dysfunction      PVD (peripheral vascular disease) with claudication ([facility] 4/24/2014    RBBB (right bundle branch block with left anterior fascicular block)      Valvular heart disease              Past Surgical History:   Past Surgical History         Past Surgical History:   Procedure Laterality Date    CARDIAC CATHETERIZATION   09/10/2012     Dr. Dash Orta EF 50%    COLONOSCOPY        CORONARY ANGIOPLASTY WITH STENT PLACEMENT   09/10/2012     Dr. Ga Eason LIZZY to PDA x1    ECHO COMPL W DOP COLOR FLOW   9/8/2012          ENDOSCOPY, COLON, DIAGNOSTIC        OTHER SURGICAL HISTORY   12/16/15     PGR by Dr. Christopher Freeman   12/16/2015    TUBAL LIGATION   1963            Social History:  Social History   Social History            Social History    Marital status:        Spouse name: N/A    Number of children: N/A    Years of education: N/A            Social History Main Topics    Smoking status: Current Every Day Smoker       Packs/day: 1.00       Years: 25.00       Types: Cigarettes    Smokeless tobacco: Never Used    Alcohol use No    Drug use: No    Sexual activity: Not on file           Other Topics Concern    Not on file          Social History Narrative    No narrative on file            Family History:   Family History         Family History   Problem Relation Age of Onset    Cancer Mother           uterine    No Known Problems Father              Allergies:    No Known Allergies     Medications Prior to Admission:   Home Medications           Prior to Admission medications    Medication Sig Start Date End Date Taking? Authorizing Provider   pregabalin (LYRICA) 75 MG capsule Take 1 capsule by mouth 3 times daily 6/20/17     ZAHRAA Noguera CNP   lidocaine (XYLOCAINE) 5 % ointment Apply topically as needed.  5/25/17     Jarod Bedolla, DO   isosorbide mononitrate (IMDUR) 30 MG extended release tablet Take 1 tablet by mouth daily 10/20/16     Isauro Flores, DO   carvedilol (COREG) 3.125 MG tablet Take 1 tablet by mouth 2 times daily (with meals) 10/20/16     Isauro Flores, DO   spironolactone (ALDACTONE) 25 MG tablet Take 1 tablet by mouth daily 10/20/16     Isauro Flores, DO   zolpidem (AMBIEN) 5 MG tablet Take 1 tablet by mouth at bedtime 9/9/16     Historical Provider, MD   furosemide (LASIX) 40 MG tablet Take 1 tablet by mouth 2 times daily 10/4/16     ZAHRAA Castro CNP   cyanocobalamin 1000 MCG/ML injection Inject 1,000 mcg into the muscle every 30 days        Historical Provider, MD   gabapentin (NEURONTIN) 300 MG capsule Take 1 capsule by mouth nightly  Patient taking differently: Take 300 mg by mouth 3 times daily 9/15/15     Anoop Donald MD   simvastatin (ZOCOR) 10 MG tablet Take 10 mg by mouth nightly  7/8/15     Historical Provider, MD   levothyroxine (SYNTHROID) 88 MCG tablet Take 88 mcg by mouth  3/27/15     Historical Provider, MD   ADVAIR DISKUS 250-50 MCG/DOSE AEPB Inhale 1 puff into the lungs as needed  4/2/13     Historical Provider, MD   NITROSTAT 0.4 MG SL tablet Place 0.4 mg under the tongue every 5 minutes as needed. 1/3/13     Historical Provider, MD           Diagnostics:       Telemetry:    12 lead EKG:CXR: Noted  ECHO 9/6/2016   Limited study ordered:   Left ventricular size is grossly normal.   Moderate left ventricular concentric hypertrophy noted.   Abnormal (paradoxical) motion consistent with conduction abnormality.   Ejection fraction is visually estimated at 35 to 40%      Intake/Output Summary (Last 24 hours) at 11/24/18 0859  Last data filed at 11/24/18 0549   Gross per 24 hour   Intake              420 ml   Output             1400 ml   Net             -980 ml       Labs:   CBC:   Recent Labs      11/23/18 0622 11/24/18   0640   WBC  28.1*  18.6*   HGB  11.9  11.5   HCT  37.0  35.0   PLT  200  200     BMP: Recent Labs      11/23/18 0622 11/24/18   0640   NA  135  134   K  4.5  3.8   CO2  19*  20*   BUN  45*  46*   CREATININE  1.9*  1.6*   LABGLOM  25  31   CALCIUM  8.4*  8.3*     Mag: No results for input(s): MG in the last 72 hours. Phos: No results for input(s): PHOS in the last 72 hours. TSH:   Recent Labs      11/23/18 0622   TSH  0.393     HgA1c:     BNP: No results for input(s): BNP in the last 72 hours. PT/INR: No results for input(s): PROTIME, INR in the last 72 hours. APTT:No results for input(s): APTT in the last 72 hours. CARDIAC ENZYMES:No results for input(s): CKTOTAL, CKMB, CKMBINDEX, TROPONINI in the last 72 hours.   FASTING LIPID PANEL:  Lab Results   Component Value Date    CHOL 158 11/23/2018    HDL 38 11/23/2018    LDLCALC 93 11/23/2018    TRIG 134 11/23/2018 LIVER PROFILE:  Recent Labs      18   0622  18   0640   AST  15  12   ALT  18  13   LABALBU  3.3*  3.1*       Current Inpatient Medications:   cefTRIAXone (ROCEPHIN) IV  1 g Intravenous Q24H    esomeprazole Magnesium  20 mg Oral Daily    levothyroxine  88 mcg Oral Daily    pregabalin  50 mg Oral TID    simvastatin  10 mg Oral Daily    ipratropium-albuterol  1 ampule Inhalation Q4H WA       IV Infusions (if any):        PHYSICAL EXAM:     CONSTITUTIONAL:   /64   Pulse 94   Temp 98.5 °F (36.9 °C) (Oral)   Resp 20   Ht 5' 4\" (1.626 m)   Wt 184 lb 9.6 oz (83.7 kg)   SpO2 97%   BMI 31.69 kg/m²   Pulse  Av.3  Min: 61  Max: 96  Systolic (07XEE), UJW:081 , Min:99 , GC    Diastolic (32QRM), HYT:00, Min:52, Max:64    In general, this is a well developed, well nourished who appears stated age. awake, alert, cooperative, no apparent distress  HEENT: eyes -conjunctivae pink,  Throat - Oral mucosa pink and moist.   Neck-  no stridor, no noted enlargement of the thyroid, no carotid bruit. no jugular venous distention   RESPIRATORY: Chest symmetrical and non-tender to palpation. No accessory muscle use. Lung auscultation - clear to auscultation except few rhonchi  CARDIOVASCULAR:     Heart Inspection shows no noted pulsations  Heart Palpation - no palpable thrills    Heart Ausculation - Regular rate and rhythm, 1/6 systolic murmur, No s3 or rub.   + velower extremity edema, no varicosities. Distal pulses palpable, no clubbing or cyanosis   ABDOMEN: Soft, nontender, nondistended. Bowel sounds present  MS: n/a  : Deferred  Rectal Exam: Deferred  SKIN: warm and dry no statis dermatitis or ulcers   NEURO / PSYCH: oriented to person, place        ASSESSMENT/PLAN:       Acute systolic CHF [facility]congestive heart failure) ([facility] - EF 35-40% in 2016 - repeat Echo pending;   Resume  Diuretics, monitor daily wts, IOs, renal Fn      Ischemic CMP -EF 35-40% - Start low dose BB, Hydralazine +Nitrates as her BP tolerates; No ACE/ARB/Entresto due to her ROSALIE      CAD s/p LIZZY to RCA  -Add ASA and BB;  Continue Statins      S/p Pacer  for CHB 11/2009; s/p PGR per family      ROSALIE/CKD  - Monitor renal Fn      Leukocytosis      Non morbid obesity        D/W family  F/y by dr Vinnie Frost after discharge    Electronically signed by Sumit Rodriguez MD on 11/24/2018 at [facility]

## 2018-11-24 NOTE — CONSULTS
Consults     Hematology/Oncology  Consult      Patient Name: Phyllis Estrella  YOB: 1935  PCP: Juan Daniel Stovall DO   Referring Provider: [address] [place] [id number]     Reason for Consultation: No chief complaint on file. History of Present Illness:  77-year-old woman hospitalized with shortness of breath generalized weakness and fatigue. She has generalized arthritic pains. She has multiple medical problems including CKD stage III, COPD, CHF by history, hypertension, hypothyroidism, peripheral vascular disease and anemia of chronic disease. Her chest x-ray on admission showed prominent interstitial markings consistent with interstitial edema and CHF  We are consulted for leukocytosis. Back in 2016 her CBC was normal.  On admission her WBC count was 28.9 with differential showing neutrophilic leukocytosis with ANC of 28 lymphopenia with absolute lymphocyte count of 0.8  Her leukocytosis has since improved and today her WBC count is down to 18. 6.   Her hemoglobin on admission was 11.9 with an MCV of 92 and normal platelet counts of 820    Diagnostic Data:     Past Medical History:   Diagnosis Date    CKD (chronic kidney disease), stage III     Complete heart block (HCC)     COPD (chronic obstructive pulmonary disease) ([address]     Depression     Hypertension     Hypothyroidism 9/8/2012    Left ventricular systolic dysfunction     PVD (peripheral vascular disease) with claudication ([address] 4/24/2014    RBBB (right bundle branch block with left anterior fascicular block)     Valvular heart disease        Patient Active Problem List    Diagnosis Date Noted    Pacemaker end of life 12/16/2015     Priority: High    Acute congestive heart failure ([address] 10/18/2016     Priority: Medium    Acute systolic CHF (congestive heart failure) ([address] 11/23/2018    CHF (congestive heart failure), NYHA class I, acute on chronic, combined ([address] 11/22/2018    Chronic pain 05/25/2017  \ 05/25/2017    Cervicothoracic kyphosis 05/25/2017    Neural foraminal stenosis of cervical spine 05/25/2017    Fatigue 10/18/2016    Pacemaker 01/07/2016    Stented coronary artery 12/02/2015    Hyperlipidemia 09/01/2015    SOB (shortness of breath) 09/01/2015    Senile cataract 10/21/2014    PVD (peripheral vascular disease) with claudication ([address] 04/24/2014    Pain in both feet 04/24/2014    Swelling of lower limb 04/24/2014    Varicose veins of both lower extremities 04/24/2014    Venous insufficiency of leg 04/24/2014    CAD (coronary artery disease) - s/p PCI - LIZZY - RCA 10/05/2012    Hypertension 09/27/2012    DJD (degenerative joint disease) 09/27/2012    Right groin pain 09/27/2012    Tobacco abuse 09/27/2012    Chronic obstructive pulmonary disease ([address] 09/27/2012    Hypothyroidism 09/08/2012    Complete heart block ([address] 11/12/2011    RBBB (right bundle branch block) 11/12/2011        Past Surgical History:   Procedure Laterality Date    CARDIAC CATHETERIZATION  09/10/2012    Dr. Zeynep Rodriguez EF 50%    COLONOSCOPY      CORONARY ANGIOPLASTY WITH STENT PLACEMENT  09/10/2012    Dr. Markie Spaulding LIZZY to PDA x1    ECHO COMPL W DOP COLOR FLOW  9/8/2012         ENDOSCOPY, COLON, DIAGNOSTIC      OTHER SURGICAL HISTORY  12/16/15    PGR by Dr. Toro Nephew  12/16/2015    TUBAL LIGATION  1963       Family History  Family History   Problem Relation Age of Onset    Cancer Mother         uterine    No Known Problems Father        Social History    TOBACCO:   reports that she has been smoking Cigarettes. She has a 25.00 pack-year smoking history. She has never used smokeless tobacco.  ETOH:   reports that she does not drink alcohol. Home Medications  Prior to Admission medications    Medication Sig Start Date End Date Taking? Authorizing Provider   pregabalin (LYRICA) 50 MG capsule Take 50 mg by mouth 3 times daily. Maddy Deutsch Historical Provider, MD

## 2018-11-25 VITALS
DIASTOLIC BLOOD PRESSURE: 62 MMHG | WEIGHT: 184.6 LBS | HEIGHT: 64 IN | SYSTOLIC BLOOD PRESSURE: 139 MMHG | OXYGEN SATURATION: 93 % | RESPIRATION RATE: 18 BRPM | TEMPERATURE: 97.4 F | HEART RATE: 60 BPM | BODY MASS INDEX: 31.51 KG/M2

## 2018-11-25 LAB
ANION GAP SERPL CALCULATED.3IONS-SCNC: 15 MMOL/L (ref 7–16)
BASOPHILS ABSOLUTE: 0.03 E9/L (ref 0–0.2)
BASOPHILS RELATIVE PERCENT: 0.3 % (ref 0–2)
BUN BLDV-MCNC: 36 MG/DL (ref 8–23)
CALCIUM SERPL-MCNC: 9 MG/DL (ref 8.6–10.2)
CHLORIDE BLD-SCNC: 100 MMOL/L (ref 98–107)
CO2: 22 MMOL/L (ref 22–29)
CREAT SERPL-MCNC: 1.3 MG/DL (ref 0.5–1)
EOSINOPHILS ABSOLUTE: 0.56 E9/L (ref 0.05–0.5)
EOSINOPHILS RELATIVE PERCENT: 4.8 % (ref 0–6)
FERRITIN: 150 NG/ML
FOLATE: 11 NG/ML (ref 4.8–24.2)
GFR AFRICAN AMERICAN: 47
GFR NON-AFRICAN AMERICAN: 39 ML/MIN/1.73
GLUCOSE BLD-MCNC: 96 MG/DL (ref 74–99)
HCT VFR BLD CALC: 31.5 % (ref 34–48)
HCT VFR BLD CALC: 32.3 % (ref 34–48)
HEMOGLOBIN: 10.5 G/DL (ref 11.5–15.5)
IMMATURE GRANULOCYTES #: 0.04 E9/L
IMMATURE GRANULOCYTES %: 0.3 % (ref 0–5)
IMMATURE RETIC FRACT: 12 % (ref 3–15.9)
IRON SATURATION: 11 % (ref 15–50)
IRON: 26 MCG/DL (ref 37–145)
LACTATE DEHYDROGENASE: 568 U/L (ref 135–214)
LYMPHOCYTES ABSOLUTE: 0.99 E9/L (ref 1.5–4)
LYMPHOCYTES RELATIVE PERCENT: 8.5 % (ref 20–42)
MCH RBC QN AUTO: 30.7 PG (ref 26–35)
MCHC RBC AUTO-ENTMCNC: 33.3 % (ref 32–34.5)
MCV RBC AUTO: 92.1 FL (ref 80–99.9)
MONOCYTES ABSOLUTE: 0.68 E9/L (ref 0.1–0.95)
MONOCYTES RELATIVE PERCENT: 5.9 % (ref 2–12)
NEUTROPHILS ABSOLUTE: 9.32 E9/L (ref 1.8–7.3)
NEUTROPHILS RELATIVE PERCENT: 80.2 % (ref 43–80)
PDW BLD-RTO: 13.9 FL (ref 11.5–15)
PLATELET # BLD: 218 E9/L (ref 130–450)
PMV BLD AUTO: 10.9 FL (ref 7–12)
POTASSIUM SERPL-SCNC: 3.7 MMOL/L (ref 3.5–5)
RBC # BLD: 3.42 E12/L (ref 3.5–5.5)
RETIC HGB EQUIVALENT: 29.5 PG (ref 28.2–36.6)
RETICULOCYTE ABSOLUTE COUNT: 0.03 E12/L
RETICULOCYTE COUNT PCT: 0.9 % (ref 0.4–1.9)
SODIUM BLD-SCNC: 137 MMOL/L (ref 132–146)
TOTAL IRON BINDING CAPACITY: 239 MCG/DL (ref 250–450)
URINE CULTURE, ROUTINE: NORMAL
VITAMIN B-12: 624 PG/ML (ref 211–946)
WBC # BLD: 11.6 E9/L (ref 4.5–11.5)

## 2018-11-25 PROCEDURE — 85025 COMPLETE CBC W/AUTO DIFF WBC: CPT

## 2018-11-25 PROCEDURE — 82728 ASSAY OF FERRITIN: CPT

## 2018-11-25 PROCEDURE — 36415 COLL VENOUS BLD VENIPUNCTURE: CPT

## 2018-11-25 PROCEDURE — 6370000000 HC RX 637 (ALT 250 FOR IP): Performed by: FAMILY MEDICINE

## 2018-11-25 PROCEDURE — 94640 AIRWAY INHALATION TREATMENT: CPT

## 2018-11-25 PROCEDURE — 80048 BASIC METABOLIC PNL TOTAL CA: CPT

## 2018-11-25 PROCEDURE — 85045 AUTOMATED RETICULOCYTE COUNT: CPT

## 2018-11-25 PROCEDURE — 82607 VITAMIN B-12: CPT

## 2018-11-25 PROCEDURE — 94667 MNPJ CHEST WALL 1ST: CPT

## 2018-11-25 PROCEDURE — 82784 ASSAY IGA/IGD/IGG/IGM EACH: CPT

## 2018-11-25 PROCEDURE — 84165 PROTEIN E-PHORESIS SERUM: CPT

## 2018-11-25 PROCEDURE — 86334 IMMUNOFIX E-PHORESIS SERUM: CPT

## 2018-11-25 PROCEDURE — 83540 ASSAY OF IRON: CPT

## 2018-11-25 PROCEDURE — 83615 LACTATE (LD) (LDH) ENZYME: CPT

## 2018-11-25 PROCEDURE — 83550 IRON BINDING TEST: CPT

## 2018-11-25 PROCEDURE — 6370000000 HC RX 637 (ALT 250 FOR IP): Performed by: INTERNAL MEDICINE

## 2018-11-25 PROCEDURE — 82746 ASSAY OF FOLIC ACID SERUM: CPT

## 2018-11-25 RX ORDER — ISOSORBIDE MONONITRATE 30 MG/1
30 TABLET, EXTENDED RELEASE ORAL DAILY
Qty: 30 TABLET | Refills: 3 | Status: SHIPPED
Start: 2018-11-26 | End: 2021-03-18 | Stop reason: SDUPTHER

## 2018-11-25 RX ORDER — CARVEDILOL 3.12 MG/1
3.12 TABLET ORAL 2 TIMES DAILY WITH MEALS
Qty: 60 TABLET | Refills: 3 | Status: SHIPPED
Start: 2018-11-25 | End: 2021-03-18 | Stop reason: SDUPTHER

## 2018-11-25 RX ORDER — ASPIRIN 81 MG/1
81 TABLET ORAL DAILY
Status: DISCONTINUED | OUTPATIENT
Start: 2018-11-25 | End: 2018-11-25 | Stop reason: HOSPADM

## 2018-11-25 RX ORDER — ASPIRIN 81 MG/1
81 TABLET ORAL DAILY
Qty: 30 TABLET | Refills: 3 | Status: SHIPPED | OUTPATIENT
Start: 2018-11-26

## 2018-11-25 RX ADMIN — ISOSORBIDE MONONITRATE 30 MG: 30 TABLET, EXTENDED RELEASE ORAL at 08:03

## 2018-11-25 RX ADMIN — FUROSEMIDE 40 MG: 40 TABLET ORAL at 08:03

## 2018-11-25 RX ADMIN — ESOMEPRAZOLE MAGNESIUM 20 MG: 20 GRANULE, DELAYED RELEASE ORAL at 08:03

## 2018-11-25 RX ADMIN — IPRATROPIUM BROMIDE AND ALBUTEROL SULFATE 1 AMPULE: .5; 3 SOLUTION RESPIRATORY (INHALATION) at 05:56

## 2018-11-25 RX ADMIN — IPRATROPIUM BROMIDE AND ALBUTEROL SULFATE 1 AMPULE: .5; 3 SOLUTION RESPIRATORY (INHALATION) at 09:13

## 2018-11-25 RX ADMIN — SIMVASTATIN 10 MG: 10 TABLET, FILM COATED ORAL at 08:03

## 2018-11-25 RX ADMIN — PREGABALIN 50 MG: 50 CAPSULE ORAL at 08:03

## 2018-11-25 RX ADMIN — ASPIRIN 81 MG: 81 TABLET, COATED ORAL at 08:03

## 2018-11-25 RX ADMIN — CARVEDILOL 3.12 MG: 3.12 TABLET, FILM COATED ORAL at 08:03

## 2018-11-25 RX ADMIN — LEVOTHYROXINE SODIUM 88 MCG: 88 TABLET ORAL at 06:07

## 2018-11-25 ASSESSMENT — PAIN SCALES - GENERAL: PAINLEVEL_OUTOF10: 0

## 2018-11-25 NOTE — PROGRESS NOTES
Continuous Infusions:  PRN Meds:.perflutren lipid microspheres, acetaminophen, zolpidem, magnesium hydroxide, melatonin        Recent Laboratory Data-     Lab Results   Component Value Date    WBC 11.6 (H) 11/25/2018    HGB 10.5 (L) 11/25/2018    HCT 32.3 (L) 11/25/2018    HCT 31.5 (L) 11/25/2018    MCV 92.1 11/25/2018     11/25/2018    LYMPHOPCT 8.5 (L) 11/25/2018    RBC 3.42 (L) 11/25/2018    MCH 30.7 11/25/2018    MCHC 33.3 11/25/2018    RDW 13.9 11/25/2018    NEUTOPHILPCT 80.2 (H) 11/25/2018    MONOPCT 5.9 11/25/2018    BASOPCT 0.3 11/25/2018    NEUTROABS 9.32 (H) 11/25/2018    LYMPHSABS 0.99 (L) 11/25/2018    MONOSABS 0.68 11/25/2018    EOSABS 0.56 (H) 11/25/2018    BASOSABS 0.03 11/25/2018       Lab Results   Component Value Date     11/25/2018    K 3.7 11/25/2018     11/25/2018    CO2 22 11/25/2018    BUN 36 (H) 11/25/2018    CREATININE 1.3 (H) 11/25/2018    GLUCOSE 96 11/25/2018    CALCIUM 9.0 11/25/2018    PROT 6.6 11/24/2018    LABALBU 3.1 (L) 11/24/2018    BILITOT 0.3 11/24/2018    ALKPHOS 59 11/24/2018    AST 12 11/24/2018    ALT 13 11/24/2018    LABGLOM 39 11/25/2018    GFRAA 47 11/25/2018         Lab Results   Component Value Date    IRON 58 07/31/2011    TIBC 285 07/31/2011    FERRITIN 64.9 07/31/2011         No results found for:       No results found for: CEA          Radiology-    Xr Chest Standard (2 Vw)    Result Date: 11/22/2018  LOCATION: 200 EXAM: XR CHEST (2 VW) COMPARISON: None HISTORY: Shortness of breath TECHNIQUE: PA and lateral  views of the chest were obtained. FINDINGS:  SUPPORT DEVICES: None. LUNGS: Mildly prominent interstitial markings identified. PLEURA: No effusions or pneumothorax. LUNG VOLUMES: Satisfactory inspirator effort. MEDIASTINAL STRUCTURES: No lymphadenopathy. Normal aortic contour. HEART SIZE: Enlarged. UPPER ABDOMEN: Unremarkable. BONES AND SOFT TISSUES: No fracture or soft tissue abnormality.      Prominent interstitial markings likely interstitial edema. ASSESSMENT/PLAN :  1- Neutrophilic leukocytosis acute likely related to an underlying infection. There is no clinical suggestion of an underlying myeloproliferative disorder     She is on antibiotics with ceftriaxone. Blood and urine cultures pending. Her WBC count is trending downward     2- Moderate anemia likely of chronic disease and partly contributed by CK D stage III   Renal function steadily improving  Iron studies and SPEP pending      Quita DE LA CRUZ [facility], M.D., F.A.C.P.   Electronically signed 11/25/2018 at 10:25 AM

## 2018-11-25 NOTE — DISCHARGE SUMMARY
Internal Medicine  Discharge Summary    NAME: Tyler Goldberg  :  1935  MRN:  [id number]  7007 Falguni Alva DO  ADMITTED: 2018      DISCHARGED: 18    ADMITTING PHYSICIAN: Rickie Valenzuela DO    CONSULTANT(S):   IP CONSULT TO ONCOLOGY  IP CONSULT TO CARDIOLOGY  IP CONSULT TO SOCIAL WORK     ADMITTING DIAGNOSIS:   CHF (congestive heart failure), NYHA class I, acute on chronic, combined ([facility] [I50.43]     DISCHARGE DIAGNOSES:   1. Acutely decompensated systolic congestive heart failure  2. Generalized weakness and deconditioning  3. Acute on chronic kidney disease stage III  4. Non-oxygen-dependent COPD  5. Essential hypertension  6. Hypothyroidism  7. Peripheral vascular disease  8. Anemia of chronic disease  9. Tobacco abuse    BRIEF HISTORY OF PRESENT ILLNESS:   The patient is a 80 y.o. female presents with generalized weakness, SOB and fatigue which has been present for the past 3 days. She lives at home with her daughter and states that she has been spending most of the day in the bed. She admits to arthritic pain. She denies any chest pain, abdominal pain, nausea, vomiting, fever, chills, dysuria, diarrhea, recent antibiotic use, recent steroid use. She does not use home oxygen.  She cannot give me very many details as to why she came to the hospital.    LABS[de-identified]  Lab Results   Component Value Date    WBC 11.6 (H) 2018    HGB 10.5 (L) 2018    HCT 32.3 (L) 2018    HCT 31.5 (L) 2018     2018     2018    K 3.7 2018     2018    CREATININE 1.3 (H) 2018    BUN 36 (H) 2018    CO2 22 2018    GLUCOSE 96 2018    ALT 13 2018    AST 12 2018    INR 1.2 2012     Lab Results   Component Value Date    INR 1.2 2012    INR 1.0 2012    PROTIME 11.3 2012    PROTIME 12.5 2012      Lab Results   Component Value Date    TSH 0.393 2018     Lab Results   Component Value Date TRIG 134 11/23/2018    TRIG 151 (H) 10/19/2016    TRIG 181 (H) 04/19/2013     Lab Results   Component Value Date    HDL 38 11/23/2018    HDL 30 10/19/2016    HDL 35.0 (A) 04/19/2013     Lab Results   Component Value Date    LDLCALC 93 11/23/2018    LDLCALC 150 (H) 10/19/2016    LDLCALC 121 (H) 04/19/2013     Lab Results   Component Value Date    LABA1C 6.9 (H) 10/19/2016       IMAGING:  Xr Chest Standard (2 Vw)    Result Date: 11/22/2018  LOCATION: 200 EXAM: XR CHEST (2 VW) COMPARISON: None HISTORY: Shortness of breath TECHNIQUE: PA and lateral  views of the chest were obtained. FINDINGS:  SUPPORT DEVICES: None. LUNGS: Mildly prominent interstitial markings identified. PLEURA: No effusions or pneumothorax. LUNG VOLUMES: Satisfactory inspirator effort. MEDIASTINAL STRUCTURES: No lymphadenopathy. Normal aortic contour. HEART SIZE: Enlarged. UPPER ABDOMEN: Unremarkable. BONES AND SOFT TISSUES: No fracture or soft tissue abnormality. Prominent interstitial markings likely interstitial edema. HOSPITAL COURSE:   Jackelyn Maradiaga was found to be suffering from acutely decompensated systolic congestive heart failure. She was evaluated by the cardiovascular team. She underwent diuresis and diuresed approximately 4 L during hospital stay. Her shortness of breath and edema improved. Renal function improved as well. Cardiac medications were maximized. Lab work and vital signs returned to baseline. She was ultimately deemed acceptable for discharge home with close outpatient follow-up. Home health care has been arranged. BRIEF PHYSICAL EXAMINATION AND LABORATORIES ON DAY OF DISCHARGE:  VITALS:  /62   Pulse 60   Temp 97.4 °F (36.3 °C) (Oral)   Resp 18   Ht 5' 4\" (1.626 m)   Wt 184 lb 9.6 oz (83.7 kg)   SpO2 93%   BMI 31.69 kg/m²     HEENT:  PERRLA. EOMI. Sclera clear. Buccal mucosa moist.    Neck:  Supple. Trachea midline. No thyromegaly. No JVD. No bruits. Heart:  Rhythm regular, rate controlled.   No murmurs. Lungs:  Symmetrical. Clear to auscultation bilaterally. No wheezes. No rhonchi. No rales. Abdomen: Soft. Non-tender. Non-distended. Bowel sounds positive. No organomegaly or masses. No pain on palpation    Extremities:  Peripheral pulses present. No peripheral edema. No ulcers. Neurologic:  Alert x 3. No focal deficit. Cranial nerves grossly intact. Skin:  No petechia. No hemorrhage. No wounds. DISPOSITION:  The patient's condition is good. At this time the patient is without objective evidence of an acute process requiring continuing hospitalization or inpatient management. They are stable for discharge with outpatient follow-up. I have spoken with the patient and discussed the results of the current hospitalization, in addition to providing specific details for the plan of care and counseling regarding the diagnosis and prognosis. The plan has been discussed in detail and they are aware of the specific conditions for emergent return, as well as the importance of follow-up. Their questions are answered at this time and they are agreeable with the plan for discharge to home with [facility]. DISCHARGE MEDICATIONS:    Susan Echevarria   Home Medication Instructions JM:[id number]    Printed on:11/25/18 1118   Medication Information                      aspirin 81 MG EC tablet  Take 1 tablet by mouth daily             carvedilol (COREG) 3.125 MG tablet  Take 1 tablet by mouth 2 times daily (with meals)             esomeprazole Magnesium (NEXIUM) 20 MG PACK  Take 20 mg by mouth daily             furosemide (LASIX) 40 MG tablet  Take 1 tablet by mouth 2 times daily             isosorbide mononitrate (IMDUR) 30 MG extended release tablet  Take 1 tablet by mouth daily             levothyroxine (SYNTHROID) 88 MCG tablet  Take 88 mcg by mouth Daily              pregabalin (LYRICA) 50 MG capsule  Take 50 mg by mouth 3 times daily. .             simvastatin (ZOCOR) 10 MG tablet  Take 10 mg by mouth daily              zolpidem (AMBIEN) 5 MG tablet  Take 1 tablet by mouth at bedtime                 FOLLOW UP/INSTRUCTIONS:  · This patient is instructed to follow-up with her primary care physician. · Patient is instructed to follow-up with the consults listed above as directed by them. · she is instructed to resume home medications and take new medications as indicated in the list above. · If the patient has a recurrence of symptoms, she is instructed to go to the ED. Preparing for this patient's discharge, including paperwork, orders, instructions, and meeting with patient did require > 30 minutes.     James Mercado DO     11/25/2018  11:18 AM

## 2018-11-26 LAB — PATHOLOGIST REVIEW: NORMAL

## 2018-11-28 ENCOUNTER — TELEPHONE (OUTPATIENT)
Dept: CARDIOLOGY CLINIC | Age: 83
End: 2018-11-28

## 2018-11-28 LAB
ALBUMIN SERPL-MCNC: 2.6 G/DL (ref 3.5–4.7)
ALPHA-1-GLOBULIN: 0.5 G/DL (ref 0.2–0.4)
ALPHA-2-GLOBULIN: 1.2 G/FL (ref 0.5–1)
BETA GLOBULIN: 1.1 G/DL (ref 0.8–1.3)
BLOOD CULTURE, ROUTINE: NORMAL
CULTURE, BLOOD 2: NORMAL
ELECTROPHORESIS: ABNORMAL
GAMMA GLOBULIN: 0.7 G/DL (ref 0.7–1.6)
IGA: 236 MG/DL (ref 70–400)
IGG: 667 MG/DL (ref 700–1600)
IGM: <25 MG/DL (ref 40–230)
IMMUNOFIXATION RESULT, SERUM: NORMAL
TOTAL PROTEIN: 6.1 G/DL (ref 6.4–8.3)

## 2020-01-16 VITALS
DIASTOLIC BLOOD PRESSURE: 88 MMHG | HEIGHT: 64 IN | WEIGHT: 182 LBS | SYSTOLIC BLOOD PRESSURE: 120 MMHG | HEART RATE: 60 BPM | BODY MASS INDEX: 31.07 KG/M2

## 2020-01-16 RX ORDER — CYANOCOBALAMIN 1000 UG/ML
1000 INJECTION INTRAMUSCULAR; SUBCUTANEOUS ONCE
COMMUNITY
End: 2020-11-13

## 2020-01-16 RX ORDER — GABAPENTIN 300 MG/1
300 CAPSULE ORAL NIGHTLY
COMMUNITY
End: 2020-11-13

## 2020-01-16 RX ORDER — CLOBETASOL PROPIONATE 0.5 MG/G
CREAM TOPICAL DAILY
COMMUNITY

## 2020-01-16 RX ORDER — ALBUTEROL SULFATE 90 UG/1
2 AEROSOL, METERED RESPIRATORY (INHALATION) EVERY 6 HOURS PRN
COMMUNITY
End: 2020-11-13

## 2020-01-16 RX ORDER — ESOMEPRAZOLE MAGNESIUM 40 MG/1
40 FOR SUSPENSION ORAL DAILY
COMMUNITY
End: 2020-01-28

## 2020-01-16 RX ORDER — LISINOPRIL 2.5 MG/1
2.5 TABLET ORAL DAILY
COMMUNITY
End: 2021-03-18 | Stop reason: SDUPTHER

## 2020-01-16 RX ORDER — NITROGLYCERIN 0.4 MG/1
0.4 TABLET SUBLINGUAL EVERY 5 MIN PRN
COMMUNITY

## 2020-01-16 RX ORDER — DULOXETIN HYDROCHLORIDE 60 MG/1
40 CAPSULE, DELAYED RELEASE ORAL DAILY
COMMUNITY

## 2020-01-16 RX ORDER — SPIRONOLACTONE 25 MG/1
25 TABLET ORAL DAILY
COMMUNITY
End: 2020-11-13

## 2020-01-20 NOTE — PROGRESS NOTES
Cardiology Progress Note  Dr. Douglas Fatima      Referring Physician: Gladys Redmond DO  CHIEF COMPLAINT:   Chief Complaint   Patient presents with    Bradycardia     6 month check/pm check. Patient denies any cp sob or dizziness. HISTORY OF PRESENT ILLNESS:   Patient is an 80year old lady with history of CAD status post PCI to RCA, hypertension, congestive heart failure, peripheral vascular disease, pacemaker in situ, is here for follow up visit. Shortness of breath is at baseline, fatigue, Patient denies any chest pain,  no lightheadedness, no dizziness, no palpitations, no PND, no orthopnea, no syncope, no presyncopal episodes.   Denies noncompliance with diuretic therapy and low-salt diet        Past Medical History:   Diagnosis Date    Athscl heart disease of native coronary artery w/o ang pctrs     Chronic combined systolic and diastolic hrt fail (HCC)     Chronic kidney disease (CKD), stage III (moderate) (HCC)     CKD (chronic kidney disease), stage III (HCC)     Complete heart block (HCC)     COPD (chronic obstructive pulmonary disease) ([facility]     Depression     Hypertension     Hypothyroidism 9/8/2012    Left ventricular systolic dysfunction     Nicotine dependence, cigarettes, uncomplicated     Presence of cardiac pacemaker     PVD (peripheral vascular disease) with claudication ([facility] 4/24/2014    RBBB (right bundle branch block with left anterior fascicular block)     Valvular heart disease          Past Surgical History:   Procedure Laterality Date    CARDIAC CATHETERIZATION  09/10/2012    Dr. Socorro Watkins EF 50%    COLONOSCOPY      CORONARY ANGIOPLASTY WITH STENT PLACEMENT  09/10/2012    Dr. Conley Cough to PDA x1    ECHO COMPL W DOP COLOR FLOW  9/8/2012         ENDOSCOPY, COLON, DIAGNOSTIC      OTHER SURGICAL HISTORY  12/16/15    PGR by Dr. Kimberly Black  2003   [facility]  12/16/2015   Santiago Pastor         Current Outpatient Medications History     Socioeconomic History    Marital status:      Spouse name: Not on file    Number of children: Not on file    Years of education: Not on file    Highest education level: Not on file   Occupational History    Not on file   Social Needs    Financial resource strain: Not on file    Food insecurity:     Worry: Not on file     Inability: Not on file    Transportation needs:     Medical: Not on file     Non-medical: Not on file   Tobacco Use    Smoking status: Current Every Day Smoker     Packs/day: 1.00     Years: 25.00     Pack years: 25.00     Types: Cigarettes    Smokeless tobacco: Never Used   Substance and Sexual Activity    Alcohol use: No     Alcohol/week: 0.0 standard drinks     Comment: 2 cups of coffee a day     Drug use: No    Sexual activity: Not on file   Lifestyle    Physical activity:     Days per week: Not on file     Minutes per session: Not on file    Stress: Not on file   Relationships    Social connections:     Talks on phone: Not on file     Gets together: Not on file     Attends Jehovah's witness service: Not on file     Active member of club or organization: Not on file     Attends meetings of clubs or organizations: Not on file     Relationship status: Not on file    Intimate partner violence:     Fear of current or ex partner: Not on file     Emotionally abused: Not on file     Physically abused: Not on file     Forced sexual activity: Not on file   Other Topics Concern    Not on file   Social History Narrative    Not on file       Family History   Problem Relation Age of Onset    Cancer Mother         uterine    No Known Problems Father        REVIEW OF SYSTEMS:     CONSTITUTIONAL:  negative for  fevers, chills, sweats, + fatigue  HEENT:  negative for  tinnitus, earaches, nasal congestion and epistaxis  RESPIRATORY:  negative for  dry cough, cough with sputum, dyspnea, wheezing and hemoptysis  GASTROINTESTINAL:  negative for nausea, vomiting, diarrhea, evidence of left ventricular mass or thrombus noted. Normal left ventricular wall thickness. Ejection fraction is measured at 54%. No regional wall motion abnormalities seen. Mild mitral regurgitation is present. No evidence of mitral valve stenosis. The aortic valve is trileaflet. The aortic valve appears mildly sclerotic. Physiologic and/or trace aortic regurgitation is noted. No hemodynamically significant aortic stenosis is present. Physiologic and/or trace tricuspid regurgitation. Regular rhythm. Stress Test: 10/7/2016) Left ventricular cavity size is noted to be enlarged on the rest and stress studies. The gated SPECT stress imaging in the short, vertical long, and horizontal long axes demonstrated a moderate defect is present in the apex that is small sized by Quantification    Angiography: 9/10/2012) Atherosclerotic coronary artery disease.    -Successful PCI of large PDA of RCA utilizing Resolute drug-eluting stent.     Cardiology Labs: BMP:    Lab Results   Component Value Date     11/25/2018    K 3.7 11/25/2018     11/25/2018    CO2 22 11/25/2018    BUN 36 11/25/2018    CREATININE 1.3 11/25/2018     CMP:    Lab Results   Component Value Date     11/25/2018    K 3.7 11/25/2018     11/25/2018    CO2 22 11/25/2018    BUN 36 11/25/2018    CREATININE 1.3 11/25/2018    PROT 6.1 11/25/2018     CBC:    Lab Results   Component Value Date    WBC 11.6 11/25/2018    RBC 3.42 11/25/2018    HGB 10.5 11/25/2018    HCT 32.3 11/25/2018    HCT 31.5 11/25/2018    MCV 92.1 11/25/2018    RDW 13.9 11/25/2018     11/25/2018     PT/INR:  No results found for: PTINR  PT/INR Warfarin:  No components found for: PTPATWAR, PTINRWAR  PTT:    Lab Results   Component Value Date    APTT 28.4 09/26/2012     PTT Heparin:  No components found for: APTTHEP  Magnesium:    Lab Results   Component Value Date    MG 2.3 10/19/2016     TSH:    Lab Results   Component Value Date    TSH

## 2020-01-28 ENCOUNTER — NURSE ONLY (OUTPATIENT)
Dept: CARDIOLOGY CLINIC | Age: 85
End: 2020-01-28
Payer: MEDICARE

## 2020-01-28 ENCOUNTER — OFFICE VISIT (OUTPATIENT)
Dept: CARDIOLOGY CLINIC | Age: 85
End: 2020-01-28
Payer: MEDICARE

## 2020-01-28 VITALS
DIASTOLIC BLOOD PRESSURE: 72 MMHG | WEIGHT: 172 LBS | HEIGHT: 66 IN | BODY MASS INDEX: 27.64 KG/M2 | HEART RATE: 62 BPM | SYSTOLIC BLOOD PRESSURE: 130 MMHG

## 2020-01-28 PROCEDURE — 99214 OFFICE O/P EST MOD 30 MIN: CPT | Performed by: INTERNAL MEDICINE

## 2020-01-28 PROCEDURE — 93000 ELECTROCARDIOGRAM COMPLETE: CPT | Performed by: INTERNAL MEDICINE

## 2020-01-28 PROCEDURE — 93280 PM DEVICE PROGR EVAL DUAL: CPT | Performed by: INTERNAL MEDICINE

## 2020-10-30 ENCOUNTER — TELEPHONE (OUTPATIENT)
Dept: CARDIOLOGY CLINIC | Age: 85
End: 2020-10-30

## 2020-10-30 NOTE — TELEPHONE ENCOUNTER
Spoke to daughter  Remote Latitude Transmitter with cell adapter ordered so patient can follow remotely with her pacemaker  Should arrive in 7-10 business days  Daughter will call when they receive it

## 2020-11-12 NOTE — PROGRESS NOTES
Cardiology Progress Note  Dr. Vu Lopes      Referring Physician: No primary care provider on file. CHIEF COMPLAINT:   Chief Complaint   Patient presents with    Coronary Artery Disease     6 month check . Patient denies any cp sob or dizziness. Patient is having numbness in her feet. Patient is having leg pain. PAtient is following with Dr Eura Hammans in [place]. Patient has pancreatitis. HISTORY OF PRESENT ILLNESS:   Patient is an 80year old lady with history of CAD status post PCI to RCA, hypertension, congestive heart failure, peripheral vascular disease, pacemaker in situ, is here for follow up visit. Shortness of breath is at baseline, fatigue, Patient denies any chest pain,  no lightheadedness, no dizziness, no palpitations, no PND, no orthopnea, no syncope, no presyncopal episodes.   Denies noncompliance with diuretic therapy and low-salt diet  Complaining of bilateral feet neuropathy      Past Medical History:   Diagnosis Date    Athscl heart disease of native coronary artery w/o ang pctrs     Chronic combined systolic and diastolic hrt fail (HCC)     Chronic kidney disease (CKD), stage III (moderate)     CKD (chronic kidney disease), stage III     Complete heart block (HCC)     COPD (chronic obstructive pulmonary disease) ([facility]     Depression     Hypertension     Hypothyroidism 9/8/2012    Left ventricular systolic dysfunction     Nicotine dependence, cigarettes, uncomplicated     Presence of cardiac pacemaker     PVD (peripheral vascular disease) with claudication ([facility] 4/24/2014    RBBB (right bundle branch block with left anterior fascicular block)     Valvular heart disease          Past Surgical History:   Procedure Laterality Date    CARDIAC CATHETERIZATION  09/10/2012    Dr. Bethanie Dove EF 50%    COLONOSCOPY      CORONARY ANGIOPLASTY WITH STENT PLACEMENT  09/10/2012    Dr. Natali Braga LIZZY to PDA x1    ECHO COMPL W DOP COLOR FLOW  9/8/2012         ENDOSCOPY, COLON, DIAGNOSTIC  OTHER SURGICAL HISTORY  12/16/15    PGR by Dr. Manuelito Keating  2003   [facility]  12/16/2015   [facility]         Current Outpatient Medications   Medication Sig Dispense Refill    torsemide (DEMADEX) 20 MG tablet TAKE 1 TABLET BY MOUTH TWICE DAILY      lisinopril (PRINIVIL;ZESTRIL) 2.5 MG tablet Take 2.5 mg by mouth daily      DULoxetine (CYMBALTA) 60 MG extended release capsule Take 60 mg by mouth daily      clobetasol (TEMOVATE) 0.05 % cream Apply topically daily Apply to area every day      nitroGLYCERIN (NITROSTAT) 0.4 MG SL tablet Place 0.4 mg under the tongue every 5 minutes as needed for Chest pain up to max of 3 total doses. If no relief after 1 dose, call 911.  aspirin 81 MG EC tablet Take 1 tablet by mouth daily 30 tablet 3    isosorbide mononitrate (IMDUR) 30 MG extended release tablet Take 1 tablet by mouth daily 30 tablet 3    carvedilol (COREG) 3.125 MG tablet Take 1 tablet by mouth 2 times daily (with meals) 60 tablet 3    pregabalin (LYRICA) 50 MG capsule Take 50 mg by mouth 3 times daily. Alma Bame esomeprazole Magnesium (NEXIUM) 20 MG PACK Take 20 mg by mouth daily      simvastatin (ZOCOR) 10 MG tablet Take 10 mg by mouth daily   3    levothyroxine (SYNTHROID) 88 MCG tablet Take 88 mcg by mouth Daily        No current facility-administered medications for this visit.           Allergies as of 11/13/2020    (No Known Allergies)       Social History     Socioeconomic History    Marital status:      Spouse name: Not on file    Number of children: Not on file    Years of education: Not on file    Highest education level: Not on file   Occupational History    Not on file   Social Needs    Financial resource strain: Not on file    Food insecurity     Worry: Not on file     Inability: Not on file    Transportation needs     Medical: Not on file     Non-medical: Not on file   Tobacco Use    Smoking status: Current Every Day Smoker Packs/day: 1.00     Years: 25.00     Pack years: 25.00     Types: Cigarettes    Smokeless tobacco: Never Used   Substance and Sexual Activity    Alcohol use: No     Alcohol/week: 0.0 standard drinks     Comment: 2 cups of coffee a day     Drug use: No    Sexual activity: Not on file   Lifestyle    Physical activity     Days per week: Not on file     Minutes per session: Not on file    Stress: Not on file   Relationships    Social connections     Talks on phone: Not on file     Gets together: Not on file     Attends Restorationist service: Not on file     Active member of club or organization: Not on file     Attends meetings of clubs or organizations: Not on file     Relationship status: Not on file    Intimate partner violence     Fear of current or ex partner: Not on file     Emotionally abused: Not on file     Physically abused: Not on file     Forced sexual activity: Not on file   Other Topics Concern    Not on file   Social History Narrative    Not on file       Family History   Problem Relation Age of Onset    Cancer Mother         uterine    No Known Problems Father        REVIEW OF SYSTEMS:     CONSTITUTIONAL:  negative for  fevers, chills, sweats, + fatigue  HEENT:  negative for  tinnitus, earaches, nasal congestion and epistaxis  RESPIRATORY:  negative for  dry cough, cough with sputum, wheezing and hemoptysis  GASTROINTESTINAL:  negative for nausea, vomiting, diarrhea, constipation, pruritus and jaundice  HEMATOLOGIC/LYMPHATIC:  negative for easy bruising, bleeding, lymphadenopathy and petechiae  ENDOCRINE:  negative for heat intolerance, cold intolerance, tremor, hair loss and diabetic symptoms including neither polyuria nor polydipsia nor blurred vision  MUSCULOSKELETAL:  negative for  myalgias, arthralgias, joint swelling, stiff joints and decreased range of motion  NEUROLOGICAL:  negative for memory problems, speech problems, visual disturbance, dysphagia, weakness and numbness      PHYSICAL EXAM:   Constitutional:  Awake, alert cooperative, no apparent distress, and appears stated age. HEENT:  Moist and pink mucous membranes, normocephalic, without obvious abnormality, atraumatic, normal ears and nose. Neck:  Supple, symmetrical, trachea midline, no JVD, no adenopathy, thyroid symmetric, not enlarged and no tenderness, good carotid upstroke bilaterally, no carotid bruit, skin normal  Lungs: No increased work of breathing, good air exchange, no rales no wheezing  Cardiovascular: Normal apical impulse, regular rate and rhythm, normal S1 and S2, no S3 or S4, no murmur, trace pedal edema, good carotid upstroke bilaterally, no carotid bruit, no JVD, no abdominal pulsating masses. Abdomen: Soft, nontender, no hepatomegaly, no splenomegaly, bowel sound positive. CHEST:  Expands symmetrically, nontender to palpation. Musculoskeletal:  No clubbing or cyanosis. No redness, warmth, or swelling of the joints. Neurological: Alert, awake, and oriented X3. /60 (Site: Right Upper Arm, Position: Sitting, Cuff Size: Large Adult)   Pulse 60   Ht 5' 6\" (1.676 m)   Wt 166 lb (75.3 kg)   BMI 26.79 kg/m²     DATA:  I personally reviewed the visit EKG with the following interpretation: AV sequential pacing    EKG 1/28/20 Paced rhythm     ECHO: 11/24/18 Summary   Left ventricular size is grossly normal.   No evidence of left ventricular mass or thrombus noted. Normal left ventricular wall thickness. Ejection fraction is measured at 54%. No regional wall motion abnormalities seen. Mild mitral regurgitation is present. No evidence of mitral valve stenosis. The aortic valve is trileaflet. The aortic valve appears mildly sclerotic. Physiologic and/or trace aortic regurgitation is noted. No hemodynamically significant aortic stenosis is present. Physiologic and/or trace tricuspid regurgitation. Regular rhythm.     Stress Test: 10/7/2016) Left ventricular cavity size is noted to be without angina pectoris :  Status post-PCI to RCA using 2.5×18 mm drug-eluting stent in 2012, doing fine, continue current treatment. 3:   Presence of cardiac pacemaker: Will interrogate                    4: Essential (primary) hypertension: Controlled               5:  Peripheral vascular disease, unspecified                 6: Chronic kidney disease, stage 3 (moderate):  Stages 3                 8:  Nicotine dependence, cigarettes, uncomplicated :  patient was counseled regarding smoking cessation                 RECOMMENDATIONS:   1. Continue current treatment    2. CHF: Daily weight, take an extra Lasix for weight gain of more than 2-3 pounds in 24 hours, compliance with diuretics, low-salt diet were all advised. 3.  Preventive cardiology: Low-salt, low-cholesterol diet, daily exercise, total cholesterol of less than 200, LDL of less than 70, smoking cessation were all advised. 4.  Will bring back to interrogate pacemaker    5. Follow-up with Dr. Rm Watkins as scheduled    6. Follow-up with Dr. Puma Hooper in 6 months, sooner if symptomatic for any reason    I have reviewed my findings and recommendations with patient and her family at bedside    Electronically signed by Jim Adam MD on 11/24/2020 at 11:27 AM  NOTE: This report was transcribed using voice recognition software.  Every effort was made to ensure accuracy; however, inadvertent computerized transcription errors may be present

## 2020-11-13 ENCOUNTER — OFFICE VISIT (OUTPATIENT)
Dept: CARDIOLOGY CLINIC | Age: 85
End: 2020-11-13
Payer: MEDICARE

## 2020-11-13 VITALS
HEART RATE: 60 BPM | WEIGHT: 166 LBS | SYSTOLIC BLOOD PRESSURE: 110 MMHG | DIASTOLIC BLOOD PRESSURE: 60 MMHG | HEIGHT: 66 IN | BODY MASS INDEX: 26.68 KG/M2

## 2020-11-13 PROCEDURE — 93000 ELECTROCARDIOGRAM COMPLETE: CPT | Performed by: INTERNAL MEDICINE

## 2020-11-13 PROCEDURE — 99214 OFFICE O/P EST MOD 30 MIN: CPT | Performed by: INTERNAL MEDICINE

## 2020-11-13 RX ORDER — TORSEMIDE 20 MG/1
TABLET ORAL
COMMUNITY
Start: 2020-11-09 | End: 2021-03-18 | Stop reason: SDUPTHER

## 2020-11-17 ENCOUNTER — NURSE ONLY (OUTPATIENT)
Dept: CARDIOLOGY CLINIC | Age: 85
End: 2020-11-17
Payer: MEDICARE

## 2020-11-17 PROCEDURE — 93296 REM INTERROG EVL PM/IDS: CPT | Performed by: INTERNAL MEDICINE

## 2020-11-17 PROCEDURE — 93294 REM INTERROG EVL PM/LDLS PM: CPT | Performed by: INTERNAL MEDICINE

## 2020-11-20 NOTE — PROGRESS NOTES
See [employer] [place] report. Remote monitoring reviewed over a 90 day period.   End of 90 day monitoring period date of service 11/17/2020

## 2021-02-22 ENCOUNTER — NURSE ONLY (OUTPATIENT)
Dept: CARDIOLOGY CLINIC | Age: 86
End: 2021-02-22
Payer: MEDICARE

## 2021-02-22 DIAGNOSIS — Z95.0 CARDIAC PACEMAKER: ICD-10-CM

## 2021-02-22 DIAGNOSIS — I44.2 COMPLETE HEART BLOCK (HCC): Primary | ICD-10-CM

## 2021-02-22 PROCEDURE — 93294 REM INTERROG EVL PM/LDLS PM: CPT | Performed by: INTERNAL MEDICINE

## 2021-02-22 PROCEDURE — 93296 REM INTERROG EVL PM/IDS: CPT | Performed by: INTERNAL MEDICINE

## 2021-03-18 RX ORDER — LISINOPRIL 2.5 MG/1
2.5 TABLET ORAL DAILY
Qty: 30 TABLET | Refills: 3 | Status: SHIPPED | OUTPATIENT
Start: 2021-03-18

## 2021-03-18 RX ORDER — CARVEDILOL 3.12 MG/1
3.12 TABLET ORAL 2 TIMES DAILY WITH MEALS
Qty: 60 TABLET | Refills: 3 | Status: SHIPPED | OUTPATIENT
Start: 2021-03-18

## 2021-03-18 RX ORDER — ISOSORBIDE MONONITRATE 30 MG/1
30 TABLET, EXTENDED RELEASE ORAL DAILY
Qty: 30 TABLET | Refills: 3 | Status: SHIPPED
Start: 2021-03-18 | End: 2021-10-12

## 2021-03-18 RX ORDER — SIMVASTATIN 10 MG
10 TABLET ORAL DAILY
Qty: 30 TABLET | Refills: 3 | Status: SHIPPED
Start: 2021-03-18 | End: 2021-11-29

## 2021-03-18 RX ORDER — TORSEMIDE 20 MG/1
TABLET ORAL
Qty: 60 TABLET | Refills: 3 | Status: SHIPPED
Start: 2021-03-18 | End: 2022-01-03

## 2021-05-24 NOTE — PROGRESS NOTES
See [employer] [place] report. Remote monitoring reviewed over a 90 day period.   End of 90 day monitoring period date of service 05/24/2021

## 2021-05-25 ENCOUNTER — NURSE ONLY (OUTPATIENT)
Dept: CARDIOLOGY CLINIC | Age: 86
End: 2021-05-25

## 2021-05-25 DIAGNOSIS — I44.2 COMPLETE HEART BLOCK (HCC): Primary | ICD-10-CM

## 2021-05-25 DIAGNOSIS — Z95.0 CARDIAC PACEMAKER: ICD-10-CM

## 2021-05-26 ENCOUNTER — TELEPHONE (OUTPATIENT)
Dept: CARDIOLOGY CLINIC | Age: 86
End: 2021-05-26

## 2021-05-26 DIAGNOSIS — R94.31 ABNORMAL EKG: Primary | ICD-10-CM

## 2021-05-26 NOTE — TELEPHONE ENCOUNTER
Spoke to patient - she is agreeable and the test is scheduled for June 16th @ 10:30am.  Instructions given over the phone

## 2021-06-15 ENCOUNTER — TELEPHONE (OUTPATIENT)
Dept: CARDIOLOGY | Age: 86
End: 2021-06-15

## 2021-06-15 ENCOUNTER — TELEPHONE (OUTPATIENT)
Dept: CARDIOLOGY CLINIC | Age: 86
End: 2021-06-15

## 2021-06-15 NOTE — TELEPHONE ENCOUNTER
Eriberto Katiuska called today and cancelled her 06/17/2021 appointment. She is having her stress test on 06/16/2021 and did not want to make 2 trips down to our office d/t the drive. She lives in [facility].  Informed her we would call her with the stress test results and I will add her to the recall list.

## 2021-06-15 NOTE — TELEPHONE ENCOUNTER
Left message on voice mail reminding patient of Mark Ledezma stress test appointment on June 16, 2021 at 1030. Instructions for test left on voice mail and asked patient to call office if unable to keep appointment.

## 2021-06-16 ENCOUNTER — HOSPITAL ENCOUNTER (OUTPATIENT)
Dept: CARDIOLOGY | Age: 86
Discharge: HOME | End: 2021-06-16
Payer: MEDICARE

## 2021-06-16 VITALS
BODY MASS INDEX: 26.68 KG/M2 | HEIGHT: 66 IN | HEART RATE: 60 BPM | SYSTOLIC BLOOD PRESSURE: 167 MMHG | WEIGHT: 166 LBS | DIASTOLIC BLOOD PRESSURE: 82 MMHG

## 2021-06-16 DIAGNOSIS — R94.31 ABNORMAL EKG: ICD-10-CM

## 2021-06-16 PROCEDURE — 6360000002 HC RX W HCPCS: Performed by: INTERNAL MEDICINE

## 2021-06-16 PROCEDURE — 78452 HT MUSCLE IMAGE SPECT MULT: CPT

## 2021-06-16 PROCEDURE — 2580000003 HC RX 258: Performed by: INTERNAL MEDICINE

## 2021-06-16 PROCEDURE — A9502 TC99M TETROFOSMIN: HCPCS | Performed by: INTERNAL MEDICINE

## 2021-06-16 PROCEDURE — 93017 CV STRESS TEST TRACING ONLY: CPT

## 2021-06-16 PROCEDURE — 3430000000 HC RX DIAGNOSTIC RADIOPHARMACEUTICAL: Performed by: INTERNAL MEDICINE

## 2021-06-16 RX ORDER — SODIUM CHLORIDE 0.9 % (FLUSH) 0.9 %
10 SYRINGE (ML) INJECTION PRN
Status: DISCONTINUED | OUTPATIENT
Start: 2021-06-16 | End: 2021-06-17 | Stop reason: HOSPADM

## 2021-06-16 RX ADMIN — SODIUM CHLORIDE, PRESERVATIVE FREE 10 ML: 5 INJECTION INTRAVENOUS at 11:49

## 2021-06-16 RX ADMIN — SODIUM CHLORIDE, PRESERVATIVE FREE 10 ML: 5 INJECTION INTRAVENOUS at 11:48

## 2021-06-16 RX ADMIN — TETROFOSMIN 31.7 MILLICURIE: 0.23 INJECTION, POWDER, LYOPHILIZED, FOR SOLUTION INTRAVENOUS at 11:48

## 2021-06-16 RX ADMIN — TETROFOSMIN 10.3 MILLICURIE: 0.23 INJECTION, POWDER, LYOPHILIZED, FOR SOLUTION INTRAVENOUS at 10:25

## 2021-06-16 RX ADMIN — SODIUM CHLORIDE, PRESERVATIVE FREE 10 ML: 5 INJECTION INTRAVENOUS at 10:25

## 2021-06-16 RX ADMIN — REGADENOSON 0.4 MG: 0.08 INJECTION, SOLUTION INTRAVENOUS at 11:48

## 2021-06-16 NOTE — PROCEDURES
[address],[place] [address] [facility]. [address], [facility]  [phone]                 Pharmacologic Stress Nuclear Gated SPECT Study    Name: [facility] Account Number: [de-identified]    :  1935          Sex: female         Date of Study:  2021    Height: 5' 6\" (167.6 cm)         Weight: 166 lb (75.3 kg)     Ordering Provider: Bridgette Martinez MD          PCP: No primary care provider on file. Cardiologist: Bridgette Martinez MD             Interpreting Physician: Bridgette Martinez MD  _________________________________________________________________________________    Indication:   Evaluation of extent and severity of coronary artery disease    Clinical History:   Patient has prior history of coronary artery disease. Resting ECG:    Paced rhythm    Procedure:   Pharmacologic stress testing was performed with regadenoson 0.4 mg for 15 seconds. The heart rate was 60 at baseline and adam to 68 beats during the infusion. The blood pressure at baseline was 167/82 and blood pressure at the end of infusion was 141/79. Blood pressure response was normal during the stress procedure. The patient tolerated the infusion well. ECG during the infusion did not change. IMAGING: Myocardial perfusion imaging was performed at rest 30-35 minutes following the intravenous injection of 10.3 mCi of (Tc-tetrofosmin) followed by 10 ml of Normal Saline. As per infusion protocol, the patient was injected intravenously with 31.7 mCi of (Tc-tetrofosmin) followed by 10 ml of Normal Saline. Gated post-stress tomographic imaging was performed 45 minutes after stress. FINDINGS: The overall quality of the study was good. Left ventricular cavity size was noted to be normal.    Rotational analog analysis demonstrated no patient motion or abnormal extracardiac radioactivity and soft tissue breast attenuation.     The gated SPECT rest and stress imaging in the short, vertical long, and horizontal long axis demonstrated decreased radiotracer uptake in the apex, on both the stress and rest images with improvement on stress images which is consistent with artifacts. Gated SPECT left ventricular ejection fraction was calculated to be 52%, with normal myocardial thickening and wall motion. Impression:    1. Electrocardiographically nondiagnostic regadenoson infusion due to pre-existing paced rhythm, with a clinically non-ischemic response  2. Myocardial perfusion imaging was normal with attenuation artifact. 3. Overall left ventricular systolic function was normal without regional wall motion abnormalities. 4. Low risk post MI pharmacologic stress test.    Thank you for sending your patient to this [employer].      Electronically signed by Aman Hernandez MD on 6/17/21 at 8:57 PM EDT

## 2021-06-18 ENCOUNTER — TELEPHONE (OUTPATIENT)
Dept: CARDIOLOGY CLINIC | Age: 86
End: 2021-06-18

## 2021-10-12 RX ORDER — ISOSORBIDE MONONITRATE 30 MG/1
30 TABLET, EXTENDED RELEASE ORAL DAILY
Qty: 30 TABLET | Refills: 3 | Status: SHIPPED | OUTPATIENT
Start: 2021-10-12

## 2021-11-29 RX ORDER — SIMVASTATIN 10 MG
10 TABLET ORAL DAILY
Qty: 30 TABLET | Refills: 3 | Status: SHIPPED
Start: 2021-11-29 | End: 2022-10-04

## 2022-01-03 RX ORDER — TORSEMIDE 20 MG/1
TABLET ORAL
Qty: 60 TABLET | Refills: 3 | Status: SHIPPED | OUTPATIENT
Start: 2022-01-03

## 2022-05-09 ENCOUNTER — NURSE ONLY (OUTPATIENT)
Dept: CARDIOLOGY CLINIC | Age: 87
End: 2022-05-09

## 2022-05-09 ENCOUNTER — OFFICE VISIT (OUTPATIENT)
Dept: CARDIOLOGY CLINIC | Age: 87
End: 2022-05-09
Payer: MEDICARE

## 2022-05-09 VITALS
HEART RATE: 65 BPM | SYSTOLIC BLOOD PRESSURE: 112 MMHG | RESPIRATION RATE: 18 BRPM | HEIGHT: 65 IN | WEIGHT: 157 LBS | DIASTOLIC BLOOD PRESSURE: 62 MMHG | BODY MASS INDEX: 26.16 KG/M2

## 2022-05-09 DIAGNOSIS — I44.2 COMPLETE HEART BLOCK (HCC): Primary | ICD-10-CM

## 2022-05-09 DIAGNOSIS — G25.81 RESTLESS LEG SYNDROME: ICD-10-CM

## 2022-05-09 PROCEDURE — 1090F PRES/ABSN URINE INCON ASSESS: CPT | Performed by: INTERNAL MEDICINE

## 2022-05-09 PROCEDURE — 1123F ACP DISCUSS/DSCN MKR DOCD: CPT | Performed by: INTERNAL MEDICINE

## 2022-05-09 PROCEDURE — 99214 OFFICE O/P EST MOD 30 MIN: CPT | Performed by: INTERNAL MEDICINE

## 2022-05-09 PROCEDURE — 4004F PT TOBACCO SCREEN RCVD TLK: CPT | Performed by: INTERNAL MEDICINE

## 2022-05-09 PROCEDURE — G8417 CALC BMI ABV UP PARAM F/U: HCPCS | Performed by: INTERNAL MEDICINE

## 2022-05-09 PROCEDURE — G8427 DOCREV CUR MEDS BY ELIG CLIN: HCPCS | Performed by: INTERNAL MEDICINE

## 2022-05-09 NOTE — PROGRESS NOTES
Cardiology Progress Note  Dr. Renetta Cloud      Referring Physician: Antwon Calhoun DO  CHIEF COMPLAINT:   Chief Complaint   Patient presents with    Coronary Artery Disease     yearly ov- pt has complaints of swelling in feet and legs       HISTORY OF PRESENT ILLNESS:   Patient is an 80year old lady with history of CAD status post PCI to RCA, hypertension, congestive heart failure, peripheral vascular disease, pacemaker in situ, is here for follow up visit. Occasional pedal edema, patient denies any chest pain,  no lightheadedness, no dizziness, no palpitations, no PND, no orthopnea, no syncope, no presyncopal episodes.   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)     Chronic kidney disease [facility]CKD), stage III (moderate) [facility]     CKD (chronic kidney disease), stage III (HCC)     Complete heart block (HCC)     COPD [facility]ronic obstructive pulmonary disease) [facility]     Depression     Hypertension     Hypothyroidism 9/8/2012    Left ventricular systolic dysfunction     Nicotine dependence, cigarettes, uncomplicated     Presence of cardiac pacemaker     PVD (peripheral vascular disease) with claudication ([facility] 4/24/2014    RBBB (right bundle branch block with left anterior fascicular block)     Valvular heart disease          Past Surgical History:   Procedure Laterality Date    CARDIAC CATHETERIZATION  09/10/2012    Dr. Roger Soriano EF 50%    COLONOSCOPY      CORONARY ANGIOPLASTY WITH STENT PLACEMENT  09/10/2012    Dr. Bryanna Pate to PDA x1    ECHO COMPL W DOP COLOR FLOW  9/8/2012         ENDOSCOPY, COLON, DIAGNOSTIC      OTHER SURGICAL HISTORY  12/16/15    PGR by Dr. Arvin Stapleton  2003   [facility]  12/16/2015    Seamus         Current Outpatient Medications   Medication Sig Dispense Refill    torsemide (DEMADEX) 20 MG tablet TAKE 1 TABLET BY MOUTH TWICE DAILY 60 tablet 3    simvastatin (ZOCOR) 10 MG tablet Take 1 tablet by mouth daily 30 tablet 3    isosorbide mononitrate (IMDUR) 30 MG extended release tablet Take 1 tablet by mouth daily 30 tablet 3    carvedilol (COREG) 3.125 MG tablet Take 1 tablet by mouth 2 times daily (with meals) 60 tablet 3    lisinopril (PRINIVIL;ZESTRIL) 2.5 MG tablet Take 1 tablet by mouth daily 30 tablet 3    DULoxetine (CYMBALTA) 60 MG extended release capsule Take 40 mg by mouth daily       clobetasol (TEMOVATE) 0.05 % cream Apply topically daily Apply to area every day      aspirin 81 MG EC tablet Take 1 tablet by mouth daily 30 tablet 3    esomeprazole Magnesium (NEXIUM) 20 MG PACK Take 20 mg by mouth daily      levothyroxine (SYNTHROID) 88 MCG tablet Take 88 mcg by mouth Daily       nitroGLYCERIN (NITROSTAT) 0.4 MG SL tablet Place 0.4 mg under the tongue every 5 minutes as needed for Chest pain up to max of 3 total doses. If no relief after 1 dose, call 911. (Patient not taking: Reported on 5/9/2022)      pregabalin (LYRICA) 50 MG capsule Take 50 mg by mouth 3 times daily. No longer taking (Patient not taking: Reported on 5/9/2022)       No current facility-administered medications for this visit.          Allergies as of 05/09/2022    (No Known Allergies)       Social History     Socioeconomic History    Marital status:      Spouse name: Not on file    Number of children: Not on file    Years of education: Not on file    Highest education level: Not on file   Occupational History    Not on file   Tobacco Use    Smoking status: Current Every Day Smoker     Packs/day: 0.50     Years: 25.00     Pack years: 12.50     Types: Cigarettes    Smokeless tobacco: Never Used   Vaping Use    Vaping Use: Never used   Substance and Sexual Activity    Alcohol use: No     Alcohol/week: 0.0 standard drinks     Comment: 2 cups of coffee a day     Drug use: No    Sexual activity: Not on file   Other Topics Concern    Not on file   Social History Narrative    Not on file     Social Determinants of Health     Financial Resource Strain:     Difficulty of Paying Living Expenses: Not on file   Food Insecurity:     Worried About Running Out of Food in the Last Year: Not on file    Moiz of Food in the Last Year: Not on file   Transportation Needs:     Lack of Transportation (Medical): Not on file    Lack of Transportation (Non-Medical):  Not on file   Physical Activity:     Days of Exercise per Week: Not on file    Minutes of Exercise per Session: Not on file   Stress:     Feeling of Stress : Not on file   Social Connections:     Frequency of Communication with Friends and Family: Not on file    Frequency of Social Gatherings with Friends and Family: Not on file    Attends Muslim Services: Not on file    Active Member of [address] [place] [address] [employer] or Organizations: Not on file    Attends Club or Organization Meetings: Not on file    Marital Status: Not on file   Intimate Partner Violence:     Fear of Current or Ex-Partner: Not on file    Emotionally Abused: Not on file    Physically Abused: Not on file    Sexually Abused: Not on file   Housing Stability:     Unable to Pay for Housing in the Last Year: Not on file    Number of [place] in the Last Year: Not on file    Unstable Housing in the Last Year: Not on file       Family History   Problem Relation Age of Onset    Cancer Mother         uterine    No Known Problems Father        REVIEW OF SYSTEMS:     CONSTITUTIONAL:  negative for  fevers, chills, sweats, + fatigue  HEENT:  negative for  tinnitus, earaches, nasal congestion and epistaxis  RESPIRATORY:  negative for  dry cough, cough with sputum, wheezing and hemoptysis  GASTROINTESTINAL:  negative for nausea, vomiting, diarrhea, constipation, pruritus and jaundice  HEMATOLOGIC/LYMPHATIC:  negative for easy bruising, bleeding, lymphadenopathy and petechiae  ENDOCRINE: negative for heat intolerance, cold intolerance, tremor, hair loss and diabetic symptoms including neither polyuria nor polydipsia nor blurred vision  MUSCULOSKELETAL:  negative for  myalgias, arthralgias, joint swelling, stiff joints and decreased range of motion  NEUROLOGICAL:  negative for memory problems, speech problems, visual disturbance, dysphagia, weakness and numbness      PHYSICAL EXAM:   Constitutional:  Awake, alert cooperative, no apparent distress, and appears stated age. HEENT:  Moist and pink mucous membranes, normocephalic, without obvious abnormality, atraumatic, normal ears and nose. Neck:  Supple, symmetrical, trachea midline, no JVD, no adenopathy, thyroid symmetric, not enlarged and no tenderness, good carotid upstroke bilaterally, no carotid bruit, skin normal  Lungs: No increased work of breathing, good air exchange, no rales no wheezing  Cardiovascular: Normal apical impulse, regular rate and rhythm, normal S1 and S2, no S3 or S4, no murmur, trace pedal edema, good carotid upstroke bilaterally, no carotid bruit, no JVD, no abdominal pulsating masses. Abdomen: Soft, nontender, no hepatomegaly, no splenomegaly, bowel sound positive. CHEST:  Expands symmetrically, nontender to palpation. Musculoskeletal:  No clubbing or cyanosis. No redness, warmth, or swelling of the joints. Neurological: Alert, awake, and oriented X3. /62   Pulse 65   Resp 18   Ht 5' 5\" (1.651 m)   Wt 157 lb (71.2 kg)   BMI 26.13 kg/m²     DATA:  I personally reviewed the visit EKG with the following interpretation: AV sequential pacing    EKG 11/13/20 AV sequential pacing      ECHO: 11/24/18 Summary   Left ventricular size is grossly normal.   No evidence of left ventricular mass or thrombus noted. Normal left ventricular wall thickness. Ejection fraction is measured at 54%. No regional wall motion abnormalities seen. Mild mitral regurgitation is present.    No evidence of mitral valve stenosis. The aortic valve is trileaflet. The aortic valve appears mildly sclerotic. Physiologic and/or trace aortic regurgitation is noted. No hemodynamically significant aortic stenosis is present. Physiologic and/or trace tricuspid regurgitation. Regular rhythm. 1. Stress Test:6/16/21 Electrocardiographically nondiagnostic regadenoson infusion due to pre-existing paced rhythm, with a clinically non-ischemic response  2. Myocardial perfusion imaging was normal with attenuation artifact. 3. Overall left ventricular systolic function was normal without regional wall motion abnormalities. 4. Low risk post MI pharmacologic stress test.     10/7/2016) Left ventricular cavity size is noted to be enlarged on the rest and stress studies. The gated SPECT stress imaging in the short, vertical long, and horizontal long axes demonstrated a moderate defect is present in the apex that is small sized by Quantification    Angiography: 9/10/2012) Atherosclerotic coronary artery disease.    -Successful PCI of large PDA of RCA utilizing Resolute drug-eluting stent.     Cardiology Labs: BMP:    Lab Results   Component Value Date     11/25/2018    K 3.7 11/25/2018     11/25/2018    CO2 22 11/25/2018    BUN 36 11/25/2018    CREATININE 1.3 11/25/2018     CMP:    Lab Results   Component Value Date     11/25/2018    K 3.7 11/25/2018     11/25/2018    CO2 22 11/25/2018    BUN 36 11/25/2018    CREATININE 1.3 11/25/2018    PROT 6.1 11/25/2018     CBC:    Lab Results   Component Value Date    WBC 11.6 11/25/2018    RBC 3.42 11/25/2018    HGB 10.5 11/25/2018    HCT 32.3 11/25/2018    HCT 31.5 11/25/2018    MCV 92.1 11/25/2018    RDW 13.9 11/25/2018     11/25/2018     PT/INR:  No results found for: PTINR  PT/INR Warfarin:  No components found for: PTPATWAR, PTINRWAR  PTT:    Lab Results   Component Value Date    APTT 28.4 09/26/2012     PTT Heparin:  No components found for: APTTHEP  Magnesium:    Lab Results   Component Value Date    MG 2.3 10/19/2016     TSH:    Lab Results   Component Value Date    TSH 0.393 11/23/2018     TROPONIN:  No components found for: TROP  BNP:    Lab Results   Component Value Date     09/09/2012     FASTING LIPID PANEL:    Lab Results   Component Value Date    CHOL 158 11/23/2018    HDL 38 11/23/2018    TRIG 134 11/23/2018     No orders to display     I have personally reviewed the laboratory, cardiac diagnostic and radiographic testing as outlined above:      IMPRESSION:  1: Chronic combined systolic (congestive) and diastolic (congestive) heart failure:  Compensated, continue current treatment           2: CAD: Status post-PCI to RCA using 2.5×18 mm drug-eluting stent in 2012, doing fine, continue current treatment. 3:   Presence of cardiac pacemaker: Interrogated, showed paroxysmal atrial fibrillation, less than 2 hours since last interrogation, will continue to follow closely       4: Essential (primary) hypertension: Controlled             5:  Peripheral vascular disease, unspecified               6: Chronic kidney disease, stage 3 (moderate):  Stages 3               7:  Nicotine dependence, cigarettes, uncomplicated :  patient was counseled regarding smoking cessation                 RECOMMENDATIONS:   1. Continue current treatment  2. CHF: Daily weight, take an extra torsemide for weight gain of more than 2-3 pounds in 24 hours, compliance with diuretics, low-salt diet were all advised. 3.  Preventive cardiology: Low-salt, low-cholesterol diet, daily exercise, total cholesterol of less than 200, LDL of less than 70, smoking cessation were all advised. 4.  Follow-up with Dr. Rm Watkins as scheduled  5.   Follow-up with Dr. Puma Hooper in 6 months, sooner if symptomatic for any reason    I have reviewed my findings and recommendations with patient and her family at bedside    Electronically signed by Jim Adam MD on 6/23/2022 at 8:38 PM  NOTE: This report was transcribed using voice recognition software.  Every effort was made to ensure accuracy; however, inadvertent computerized transcription errors may be present

## 2022-10-04 RX ORDER — SIMVASTATIN 10 MG
10 TABLET ORAL DAILY
Qty: 30 TABLET | Refills: 3 | Status: SHIPPED | OUTPATIENT
Start: 2022-10-04

## 2022-11-18 RX ORDER — LISINOPRIL 2.5 MG/1
2.5 TABLET ORAL DAILY
Qty: 30 TABLET | Refills: 3 | Status: SHIPPED | OUTPATIENT
Start: 2022-11-18

## 2023-04-21 ENCOUNTER — NURSE ONLY (OUTPATIENT)
Dept: CARDIOLOGY CLINIC | Age: 88
End: 2023-04-21

## 2023-04-21 ENCOUNTER — OFFICE VISIT (OUTPATIENT)
Dept: CARDIOLOGY CLINIC | Age: 88
End: 2023-04-21

## 2023-04-21 VITALS
SYSTOLIC BLOOD PRESSURE: 122 MMHG | HEART RATE: 60 BPM | RESPIRATION RATE: 16 BRPM | DIASTOLIC BLOOD PRESSURE: 64 MMHG | BODY MASS INDEX: 26.16 KG/M2 | WEIGHT: 157 LBS | HEIGHT: 65 IN

## 2023-04-21 DIAGNOSIS — I44.2 COMPLETE HEART BLOCK (HCC): Primary | ICD-10-CM

## 2023-04-21 NOTE — PROGRESS NOTES
-Successful PCI of large PDA of RCA utilizing Resolute drug-eluting stent.     Cardiology Labs: BMP:    Lab Results   Component Value Date/Time     11/25/2018 06:34 AM    K 3.7 11/25/2018 06:34 AM     11/25/2018 06:34 AM    CO2 22 11/25/2018 06:34 AM    BUN 36 11/25/2018 06:34 AM    CREATININE 1.3 11/25/2018 06:34 AM     CMP:    Lab Results   Component Value Date/Time     11/25/2018 06:34 AM    K 3.7 11/25/2018 06:34 AM     11/25/2018 06:34 AM    CO2 22 11/25/2018 06:34 AM    BUN 36 11/25/2018 06:34 AM    CREATININE 1.3 11/25/2018 06:34 AM    PROT 6.1 11/25/2018 06:34 AM     CBC:    Lab Results   Component Value Date/Time    WBC 11.6 11/25/2018 06:35 AM    RBC 3.42 11/25/2018 06:35 AM    HGB 10.5 11/25/2018 06:35 AM    HCT 32.3 11/25/2018 06:35 AM    HCT 31.5 11/25/2018 06:35 AM    MCV 92.1 11/25/2018 06:35 AM    RDW 13.9 11/25/2018 06:35 AM     11/25/2018 06:35 AM     PT/INR:  No results found for: PTINR  PT/INR Warfarin:  No components found for: PTPATWAR, PTINRWAR  PTT:    Lab Results   Component Value Date/Time    APTT 28.4 09/26/2012 08:20 PM     PTT Heparin:  No components found for: APTTHEP  Magnesium:    Lab Results   Component Value Date/Time    MG 2.3 10/19/2016 05:13 AM     TSH:    Lab Results   Component Value Date/Time    TSH 0.393 11/23/2018 06:22 AM     TROPONIN:  No components found for: TROP  BNP:    Lab Results   Component Value Date/Time     09/09/2012 06:30 AM     FASTING LIPID PANEL:    Lab Results   Component Value Date/Time    CHOL 158 11/23/2018 06:22 AM    HDL 38 11/23/2018 06:22 AM    TRIG 134 11/23/2018 06:22 AM     No orders to display     I have personally reviewed the laboratory, cardiac diagnostic and radiographic testing as outlined above:      IMPRESSION:  1: Chronic combined congestive heart failure:  Compensated, continue current treatment           2: CAD: Status post-PCI to RCA using 2.5×18 mm drug-eluting stent in 2012, doing fine, continue

## 2023-07-03 RX ORDER — LISINOPRIL 2.5 MG/1
2.5 TABLET ORAL DAILY
Qty: 30 TABLET | Refills: 3 | Status: SHIPPED | OUTPATIENT
Start: 2023-07-03

## 2023-07-05 NOTE — PROGRESS NOTES
[address] [facility] [address] [employer] Electrophysiology  Consultation Report  PATIENT: Jese Byers  MEDICAL RECORD NUMBER: [id number]  DATE OF SERVICE:  7/06/2023  ATTENDING ELECTROPHYSIOLOGIST: Franco Frazier MD  REFERRING PHYSICIAN: No ref. provider found and Stacy Harley DO  CHIEF COMPLAINT:   Chief Complaint   Patient presents with    Coronary Artery Disease     NP per Dr. Guille Damian nearing [facility]          HPI: This is a 80 y.o. female with a history of  CAD status post PCI to RCA, hypertension, congestive heart failure, peripheral vascular disease, pacemaker in situ,  who presents to cardiac electrophysiology clinic for consultation regarding pacemaker generator change since her device is reaching [facility]. Patient continues to smoke. She uses a walker to ambulate and has not fallen for at least 2 to 3 months. No chest pain or shortness of breath. No episodes of syncope. Patient Active Problem List    Diagnosis Date Noted    Pacemaker end of life 12/16/2015     Priority: High    Restless leg syndrome      Priority: Medium    Acute congestive heart failure ([address]) 10/18/2016     Priority: Medium    Acute systolic CHF (congestive heart failure) ([address]) 11/23/2018    CHF (congestive heart failure), NYHA class I, acute on chronic, combined ([address]) 11/22/2018    Chronic pain 05/25/2017    \ 05/25/2017    Cervicothoracic kyphosis 05/25/2017    Neural foraminal stenosis of cervical spine 05/25/2017    Fatigue 10/18/2016    Pacemaker 01/07/2016     Overview Note:     A. Dual chamber BSCI: triggered GUNNAR 10/19/15  B. S/P PPM generator change 12/16/15: pacemaker generator is a BSCO, model #: [id number], serial #: [id number]. C. Explanted device: pacemaker generator is a GDT, model #: [id number], serial #: [id number]. D. The right atrial lead is a MDT model #: M8916003, serial #: [id number]. E. The RV pacing lead is a GDT model #: 2886, serial #: [id number].        Stented coronary artery 12/02/2015    Hyperlipidemia 09/01/2015

## 2023-07-06 ENCOUNTER — OFFICE VISIT (OUTPATIENT)
Dept: NON INVASIVE DIAGNOSTICS | Age: 88
End: 2023-07-06

## 2023-07-06 VITALS
DIASTOLIC BLOOD PRESSURE: 72 MMHG | SYSTOLIC BLOOD PRESSURE: 128 MMHG | BODY MASS INDEX: 26.16 KG/M2 | WEIGHT: 157 LBS | HEIGHT: 65 IN | RESPIRATION RATE: 16 BRPM | HEART RATE: 61 BPM

## 2023-07-06 DIAGNOSIS — Z95.0 PACEMAKER: Primary | ICD-10-CM

## 2023-07-06 DIAGNOSIS — I48.0 PAROXYSMAL ATRIAL FIBRILLATION (HCC): ICD-10-CM

## 2023-09-07 ENCOUNTER — HOSPITAL ENCOUNTER (OUTPATIENT)
Dept: CARDIOLOGY | Age: 88
Discharge: HOME | End: 2023-09-07
Attending: INTERNAL MEDICINE
Payer: MEDICARE

## 2023-09-07 DIAGNOSIS — Z95.0 PACEMAKER: ICD-10-CM

## 2023-09-07 DIAGNOSIS — I48.0 PAROXYSMAL ATRIAL FIBRILLATION (HCC): ICD-10-CM

## 2023-09-07 PROCEDURE — 93306 TTE W/DOPPLER COMPLETE: CPT

## 2023-09-14 ENCOUNTER — TELEPHONE (OUTPATIENT)
Dept: NON INVASIVE DIAGNOSTICS | Age: 88
End: 2023-09-14

## 2023-09-14 NOTE — TELEPHONE ENCOUNTER
Patient called in asking for results of her Echo and wants to know \"how he Afib is looking on her device since she started on her blood thinner\" She has an appointment for 10/12/2023. But wants to know what you think. Please advise.

## 2023-10-12 ENCOUNTER — OFFICE VISIT (OUTPATIENT)
Dept: NON INVASIVE DIAGNOSTICS | Age: 88
End: 2023-10-12
Payer: MEDICARE

## 2023-10-12 VITALS
WEIGHT: 153 LBS | HEART RATE: 66 BPM | RESPIRATION RATE: 16 BRPM | DIASTOLIC BLOOD PRESSURE: 62 MMHG | HEIGHT: 62 IN | BODY MASS INDEX: 28.16 KG/M2 | SYSTOLIC BLOOD PRESSURE: 136 MMHG

## 2023-10-12 DIAGNOSIS — Z95.0 CARDIAC PACEMAKER IN SITU: Primary | ICD-10-CM

## 2023-10-12 DIAGNOSIS — I48.0 PAROXYSMAL ATRIAL FIBRILLATION (HCC): Primary | ICD-10-CM

## 2023-10-12 PROCEDURE — 1123F ACP DISCUSS/DSCN MKR DOCD: CPT | Performed by: INTERNAL MEDICINE

## 2023-10-12 PROCEDURE — G8427 DOCREV CUR MEDS BY ELIG CLIN: HCPCS | Performed by: INTERNAL MEDICINE

## 2023-10-12 PROCEDURE — 4004F PT TOBACCO SCREEN RCVD TLK: CPT | Performed by: INTERNAL MEDICINE

## 2023-10-12 PROCEDURE — G8484 FLU IMMUNIZE NO ADMIN: HCPCS | Performed by: INTERNAL MEDICINE

## 2023-10-12 PROCEDURE — G8417 CALC BMI ABV UP PARAM F/U: HCPCS | Performed by: INTERNAL MEDICINE

## 2023-10-12 PROCEDURE — 1090F PRES/ABSN URINE INCON ASSESS: CPT | Performed by: INTERNAL MEDICINE

## 2023-10-12 PROCEDURE — 99215 OFFICE O/P EST HI 40 MIN: CPT | Performed by: INTERNAL MEDICINE

## 2023-10-12 PROCEDURE — 93000 ELECTROCARDIOGRAM COMPLETE: CPT | Performed by: INTERNAL MEDICINE

## 2023-10-12 RX ORDER — ESOMEPRAZOLE MAGNESIUM 40 MG/1
40 CAPSULE, DELAYED RELEASE ORAL DAILY
COMMUNITY
Start: 2023-09-06

## 2023-10-12 RX ORDER — ROPINIROLE 0.5 MG/1
0.5 TABLET, FILM COATED ORAL
COMMUNITY
Start: 2023-09-06

## 2023-10-12 NOTE — PROGRESS NOTES
counseling and or coordination of care with the other providers, preparation for the clinic visit, reviewing records/tests, counseling/education of the patient, ordering medications/tests/procedures, coordinating care, and documenting clinical information in the EHR. Thank you for allowing me to participate in your patient's care. Please call me if there are any questions or concerns.       Tanisha Pavon MD, [facility]  Cardiac Electrophysiology  [facility]) Physicians  [facility] [place]: [facility] Electrophysiology

## 2023-10-19 ENCOUNTER — TELEPHONE (OUTPATIENT)
Dept: NON INVASIVE DIAGNOSTICS | Age: 88
End: 2023-10-19

## 2023-10-19 NOTE — TELEPHONE ENCOUNTER
Patient is scheduled for a Pacemaker Gen change and a upgrade to a [address] on 11/3/2023 with Dr. Carolyn Rosas. CPT: 01899  ICD: T82.111A  INS: Primary  Medicare id: [id number]         Secondary: Medicaid id: [id number]      Medicare primary Insurance. No Prior Auth required.

## 2023-10-31 PROCEDURE — 93294 REM INTERROG EVL PM/LDLS PM: CPT | Performed by: INTERNAL MEDICINE

## 2023-10-31 PROCEDURE — 93296 REM INTERROG EVL PM/IDS: CPT | Performed by: INTERNAL MEDICINE

## 2023-11-02 ENCOUNTER — TELEPHONE (OUTPATIENT)
Dept: CARDIAC CATH/INVASIVE PROCEDURES | Age: 88
End: 2023-11-02

## 2023-11-02 ENCOUNTER — ANESTHESIA EVENT (OUTPATIENT)
Dept: CARDIAC CATH/INVASIVE PROCEDURES | Age: 88
End: 2023-11-02
Payer: MEDICARE

## 2023-11-03 ENCOUNTER — HOSPITAL ENCOUNTER (OUTPATIENT)
Dept: CARDIAC CATH/INVASIVE PROCEDURES | Age: 88
Setting detail: OBSERVATION
Discharge: HOME | End: 2023-11-04
Attending: INTERNAL MEDICINE | Admitting: INTERNAL MEDICINE
Payer: MEDICARE

## 2023-11-03 ENCOUNTER — ANESTHESIA (OUTPATIENT)
Dept: CARDIAC CATH/INVASIVE PROCEDURES | Age: 88
End: 2023-11-03
Payer: MEDICARE

## 2023-11-03 ENCOUNTER — HOSPITAL ENCOUNTER (OUTPATIENT)
Dept: GENERAL RADIOLOGY | Age: 88
End: 2023-11-03
Payer: MEDICARE

## 2023-11-03 DIAGNOSIS — Z95.0 PACEMAKER: ICD-10-CM

## 2023-11-03 DIAGNOSIS — I48.0 PAROXYSMAL ATRIAL FIBRILLATION (HCC): ICD-10-CM

## 2023-11-03 PROBLEM — I48.91 A-FIB (HCC): Status: ACTIVE | Noted: 2023-11-03

## 2023-11-03 LAB
ALBUMIN SERPL-MCNC: 4.2 G/DL (ref 3.5–5.2)
ALP SERPL-CCNC: 70 U/L (ref 35–104)
ALT SERPL-CCNC: 6 U/L (ref 0–32)
ANION GAP SERPL CALCULATED.3IONS-SCNC: 10 MMOL/L (ref 7–16)
AST SERPL-CCNC: 12 U/L (ref 0–31)
BASOPHILS # BLD: 0.06 K/UL (ref 0–0.2)
BASOPHILS NFR BLD: 1 % (ref 0–2)
BILIRUB SERPL-MCNC: 0.4 MG/DL (ref 0–1.2)
BUN SERPL-MCNC: 51 MG/DL (ref 6–23)
CALCIUM SERPL-MCNC: 9.8 MG/DL (ref 8.6–10.2)
CHLORIDE SERPL-SCNC: 100 MMOL/L (ref 98–107)
CO2 SERPL-SCNC: 27 MMOL/L (ref 22–29)
CREAT SERPL-MCNC: 1.8 MG/DL (ref 0.5–1)
EOSINOPHIL # BLD: 0.82 K/UL (ref 0.05–0.5)
EOSINOPHILS RELATIVE PERCENT: 14 % (ref 0–6)
ERYTHROCYTE [DISTWIDTH] IN BLOOD BY AUTOMATED COUNT: 13.2 % (ref 11.5–15)
GFR SERPL CREATININE-BSD FRML MDRD: 28 ML/MIN/1.73M2
GLUCOSE SERPL-MCNC: 106 MG/DL (ref 74–99)
HCT VFR BLD AUTO: 38.5 % (ref 34–48)
HGB BLD-MCNC: 12.4 G/DL (ref 11.5–15.5)
IMM GRANULOCYTES # BLD AUTO: <0.03 K/UL (ref 0–0.58)
IMM GRANULOCYTES NFR BLD: 0 % (ref 0–5)
LYMPHOCYTES NFR BLD: 1.07 K/UL (ref 1.5–4)
LYMPHOCYTES RELATIVE PERCENT: 18 % (ref 20–42)
MAGNESIUM SERPL-MCNC: 2.5 MG/DL (ref 1.6–2.6)
MCH RBC QN AUTO: 29.4 PG (ref 26–35)
MCHC RBC AUTO-ENTMCNC: 32.2 G/DL (ref 32–34.5)
MCV RBC AUTO: 91.2 FL (ref 80–99.9)
MONOCYTES NFR BLD: 0.55 K/UL (ref 0.1–0.95)
MONOCYTES NFR BLD: 9 % (ref 2–12)
NEUTROPHILS NFR BLD: 58 % (ref 43–80)
NEUTS SEG NFR BLD: 3.52 K/UL (ref 1.8–7.3)
PLATELET # BLD AUTO: 312 K/UL (ref 130–450)
PMV BLD AUTO: 10.1 FL (ref 7–12)
POTASSIUM SERPL-SCNC: 4.8 MMOL/L (ref 3.5–5)
PROT SERPL-MCNC: 7.3 G/DL (ref 6.4–8.3)
RBC # BLD AUTO: 4.22 M/UL (ref 3.5–5.5)
SODIUM SERPL-SCNC: 137 MMOL/L (ref 132–146)
TSH SERPL DL<=0.05 MIU/L-ACNC: 0.57 UIU/ML (ref 0.27–4.2)
WBC OTHER # BLD: 6 K/UL (ref 4.5–11.5)

## 2023-11-03 PROCEDURE — 93005 ELECTROCARDIOGRAM TRACING: CPT | Performed by: INTERNAL MEDICINE

## 2023-11-03 PROCEDURE — 6370000000 HC RX 637 (ALT 250 FOR IP): Performed by: INTERNAL MEDICINE

## 2023-11-03 PROCEDURE — C1769 GUIDE WIRE: HCPCS

## 2023-11-03 PROCEDURE — 2580000003 HC RX 258: Performed by: NURSE ANESTHETIST, CERTIFIED REGISTERED

## 2023-11-03 PROCEDURE — 33229 REMV&REPLC PM GEN MULT LEADS: CPT | Performed by: INTERNAL MEDICINE

## 2023-11-03 PROCEDURE — 80053 COMPREHEN METABOLIC PANEL: CPT

## 2023-11-03 PROCEDURE — C1900 LEAD, CORONARY VENOUS: HCPCS

## 2023-11-03 PROCEDURE — 85025 COMPLETE CBC W/AUTO DIFF WBC: CPT

## 2023-11-03 PROCEDURE — G0378 HOSPITAL OBSERVATION PER HR: HCPCS

## 2023-11-03 PROCEDURE — C1889 IMPLANT/INSERT DEVICE, NOC: HCPCS

## 2023-11-03 PROCEDURE — 2580000003 HC RX 258

## 2023-11-03 PROCEDURE — 2500000003 HC RX 250 WO HCPCS

## 2023-11-03 PROCEDURE — 83735 ASSAY OF MAGNESIUM: CPT

## 2023-11-03 PROCEDURE — 6360000002 HC RX W HCPCS

## 2023-11-03 PROCEDURE — 33225 L VENTRIC PACING LEAD ADD-ON: CPT

## 2023-11-03 PROCEDURE — 2580000003 HC RX 258: Performed by: INTERNAL MEDICINE

## 2023-11-03 PROCEDURE — 33229 REMV&REPLC PM GEN MULT LEADS: CPT

## 2023-11-03 PROCEDURE — C2621 PMKR, OTHER THAN SING/DUAL: HCPCS

## 2023-11-03 PROCEDURE — C1730 CATH, EP, 19 OR FEW ELECT: HCPCS

## 2023-11-03 PROCEDURE — C1894 INTRO/SHEATH, NON-LASER: HCPCS

## 2023-11-03 PROCEDURE — 6360000002 HC RX W HCPCS: Performed by: NURSE ANESTHETIST, CERTIFIED REGISTERED

## 2023-11-03 PROCEDURE — 2720000010 HC SURG SUPPLY STERILE

## 2023-11-03 PROCEDURE — 33225 L VENTRIC PACING LEAD ADD-ON: CPT | Performed by: INTERNAL MEDICINE

## 2023-11-03 PROCEDURE — 2709999900 HC NON-CHARGEABLE SUPPLY

## 2023-11-03 PROCEDURE — 84443 ASSAY THYROID STIM HORMONE: CPT

## 2023-11-03 PROCEDURE — 3700000000 HC ANESTHESIA ATTENDED CARE: Performed by: ANESTHESIOLOGY

## 2023-11-03 PROCEDURE — 6360000002 HC RX W HCPCS: Performed by: INTERNAL MEDICINE

## 2023-11-03 PROCEDURE — 3700000001 HC ADD 15 MINUTES (ANESTHESIA): Performed by: ANESTHESIOLOGY

## 2023-11-03 PROCEDURE — C1887 CATHETER, GUIDING: HCPCS

## 2023-11-03 PROCEDURE — 71045 X-RAY EXAM CHEST 1 VIEW: CPT

## 2023-11-03 RX ORDER — MIDAZOLAM HYDROCHLORIDE 1 MG/ML
INJECTION INTRAMUSCULAR; INTRAVENOUS PRN
Status: DISCONTINUED | OUTPATIENT
Start: 2023-11-03 | End: 2023-11-03 | Stop reason: SDUPTHER

## 2023-11-03 RX ORDER — ONDANSETRON 4 MG/1
4 TABLET, ORALLY DISINTEGRATING ORAL EVERY 8 HOURS PRN
Status: DISCONTINUED | OUTPATIENT
Start: 2023-11-03 | End: 2023-11-04 | Stop reason: HOSPADM

## 2023-11-03 RX ORDER — ONDANSETRON 2 MG/ML
4 INJECTION INTRAMUSCULAR; INTRAVENOUS EVERY 6 HOURS PRN
Status: DISCONTINUED | OUTPATIENT
Start: 2023-11-03 | End: 2023-11-04 | Stop reason: HOSPADM

## 2023-11-03 RX ORDER — SODIUM CHLORIDE 9 MG/ML
INJECTION, SOLUTION INTRAVENOUS CONTINUOUS PRN
Status: DISCONTINUED | OUTPATIENT
Start: 2023-11-03 | End: 2023-11-03 | Stop reason: SDUPTHER

## 2023-11-03 RX ORDER — DULOXETIN HYDROCHLORIDE 20 MG/1
20 CAPSULE, DELAYED RELEASE ORAL DAILY
Status: DISCONTINUED | OUTPATIENT
Start: 2023-11-03 | End: 2023-11-03

## 2023-11-03 RX ORDER — PROPOFOL 10 MG/ML
INJECTION, EMULSION INTRAVENOUS CONTINUOUS PRN
Status: DISCONTINUED | OUTPATIENT
Start: 2023-11-03 | End: 2023-11-03 | Stop reason: SDUPTHER

## 2023-11-03 RX ORDER — CEFAZOLIN SODIUM 1 G/3ML
INJECTION, POWDER, FOR SOLUTION INTRAMUSCULAR; INTRAVENOUS PRN
Status: DISCONTINUED | OUTPATIENT
Start: 2023-11-03 | End: 2023-11-03 | Stop reason: SDUPTHER

## 2023-11-03 RX ORDER — ASPIRIN 81 MG/1
81 TABLET ORAL DAILY
Status: DISCONTINUED | OUTPATIENT
Start: 2023-11-04 | End: 2023-11-04 | Stop reason: HOSPADM

## 2023-11-03 RX ORDER — LISINOPRIL 5 MG/1
2.5 TABLET ORAL DAILY
Status: DISCONTINUED | OUTPATIENT
Start: 2023-11-04 | End: 2023-11-04 | Stop reason: HOSPADM

## 2023-11-03 RX ORDER — VANCOMYCIN HYDROCHLORIDE 1 G/20ML
INJECTION, POWDER, LYOPHILIZED, FOR SOLUTION INTRAVENOUS PRN
Status: DISCONTINUED | OUTPATIENT
Start: 2023-11-03 | End: 2023-11-03 | Stop reason: SDUPTHER

## 2023-11-03 RX ORDER — LEVOTHYROXINE SODIUM 88 UG/1
88 TABLET ORAL DAILY
Status: DISCONTINUED | OUTPATIENT
Start: 2023-11-04 | End: 2023-11-04 | Stop reason: HOSPADM

## 2023-11-03 RX ORDER — CARVEDILOL 6.25 MG/1
3.12 TABLET ORAL 2 TIMES DAILY WITH MEALS
Status: DISCONTINUED | OUTPATIENT
Start: 2023-11-04 | End: 2023-11-04

## 2023-11-03 RX ORDER — DULOXETIN HYDROCHLORIDE 60 MG/1
60 CAPSULE, DELAYED RELEASE ORAL DAILY
Status: DISCONTINUED | OUTPATIENT
Start: 2023-11-03 | End: 2023-11-04 | Stop reason: HOSPADM

## 2023-11-03 RX ORDER — SODIUM CHLORIDE 9 MG/ML
INJECTION, SOLUTION INTRAVENOUS PRN
Status: DISCONTINUED | OUTPATIENT
Start: 2023-11-03 | End: 2023-11-04 | Stop reason: HOSPADM

## 2023-11-03 RX ORDER — FENTANYL CITRATE 50 UG/ML
INJECTION, SOLUTION INTRAMUSCULAR; INTRAVENOUS PRN
Status: DISCONTINUED | OUTPATIENT
Start: 2023-11-03 | End: 2023-11-03 | Stop reason: SDUPTHER

## 2023-11-03 RX ORDER — PANTOPRAZOLE SODIUM 40 MG/1
40 TABLET, DELAYED RELEASE ORAL
Status: DISCONTINUED | OUTPATIENT
Start: 2023-11-04 | End: 2023-11-04 | Stop reason: HOSPADM

## 2023-11-03 RX ORDER — MEMANTINE HYDROCHLORIDE 5 MG/1
5 TABLET ORAL NIGHTLY
COMMUNITY

## 2023-11-03 RX ORDER — ROPINIROLE 0.5 MG/1
0.5 TABLET, FILM COATED ORAL NIGHTLY
Status: DISCONTINUED | OUTPATIENT
Start: 2023-11-03 | End: 2023-11-04 | Stop reason: HOSPADM

## 2023-11-03 RX ORDER — SODIUM CHLORIDE 0.9 % (FLUSH) 0.9 %
5-40 SYRINGE (ML) INJECTION PRN
Status: DISCONTINUED | OUTPATIENT
Start: 2023-11-03 | End: 2023-11-04 | Stop reason: HOSPADM

## 2023-11-03 RX ORDER — SODIUM CHLORIDE 0.9 % (FLUSH) 0.9 %
5-40 SYRINGE (ML) INJECTION EVERY 12 HOURS SCHEDULED
Status: DISCONTINUED | OUTPATIENT
Start: 2023-11-03 | End: 2023-11-04 | Stop reason: HOSPADM

## 2023-11-03 RX ADMIN — FENTANYL CITRATE 50 MCG: 50 INJECTION, SOLUTION INTRAMUSCULAR; INTRAVENOUS at 13:16

## 2023-11-03 RX ADMIN — PROPOFOL 30 MG: 10 INJECTION, EMULSION INTRAVENOUS at 16:31

## 2023-11-03 RX ADMIN — SODIUM CHLORIDE: 9 INJECTION, SOLUTION INTRAVENOUS at 13:15

## 2023-11-03 RX ADMIN — FENTANYL CITRATE 25 MCG: 50 INJECTION, SOLUTION INTRAMUSCULAR; INTRAVENOUS at 16:11

## 2023-11-03 RX ADMIN — WATER 1000 MG: 1 INJECTION INTRAMUSCULAR; INTRAVENOUS; SUBCUTANEOUS at 23:36

## 2023-11-03 RX ADMIN — FENTANYL CITRATE 50 MCG: 50 INJECTION, SOLUTION INTRAMUSCULAR; INTRAVENOUS at 15:17

## 2023-11-03 RX ADMIN — FENTANYL CITRATE 50 MCG: 50 INJECTION, SOLUTION INTRAMUSCULAR; INTRAVENOUS at 14:04

## 2023-11-03 RX ADMIN — PROPOFOL 20 MCG/KG/MIN: 10 INJECTION, EMULSION INTRAVENOUS at 13:14

## 2023-11-03 RX ADMIN — SODIUM CHLORIDE: 9 INJECTION, SOLUTION INTRAVENOUS at 12:44

## 2023-11-03 RX ADMIN — MIDAZOLAM 1 MG: 1 INJECTION INTRAMUSCULAR; INTRAVENOUS at 14:04

## 2023-11-03 RX ADMIN — DULOXETINE HYDROCHLORIDE 60 MG: 60 CAPSULE, DELAYED RELEASE ORAL at 23:12

## 2023-11-03 RX ADMIN — MIDAZOLAM 1 MG: 1 INJECTION INTRAMUSCULAR; INTRAVENOUS at 13:16

## 2023-11-03 RX ADMIN — VANCOMYCIN HYDROCHLORIDE 1000 MG: 1 INJECTION, POWDER, LYOPHILIZED, FOR SOLUTION INTRAVENOUS at 13:15

## 2023-11-03 RX ADMIN — FENTANYL CITRATE 25 MCG: 50 INJECTION, SOLUTION INTRAMUSCULAR; INTRAVENOUS at 15:51

## 2023-11-03 RX ADMIN — CEFAZOLIN 2 G: 1 INJECTION, POWDER, FOR SOLUTION INTRAMUSCULAR; INTRAVENOUS at 13:15

## 2023-11-03 RX ADMIN — ROPINIROLE HYDROCHLORIDE 0.5 MG: 0.5 TABLET, FILM COATED ORAL at 23:12

## 2023-11-03 RX ADMIN — SODIUM CHLORIDE, PRESERVATIVE FREE 10 ML: 5 INJECTION INTRAVENOUS at 23:12

## 2023-11-03 ASSESSMENT — LIFESTYLE VARIABLES: SMOKING_STATUS: 1

## 2023-11-03 ASSESSMENT — ENCOUNTER SYMPTOMS: SHORTNESS OF BREATH: 1

## 2023-11-03 NOTE — PROGRESS NOTES
4 Eyes Skin Assessment     NAME:  Shashi Lemus  YOB: 1935  MEDICAL RECORD NUMBER:  [id number]    The patient is being assessed for  Admission    I agree that at least one RN has performed a thorough Head to Toe Skin Assessment on the patient. ALL assessment sites listed below have been assessed. Areas assessed by both nurses:    Head, Face, Ears, Shoulders, Back, Chest, Arms, Elbows, Hands, Sacrum. Buttock, Coccyx, Ischium, Legs. Feet and Heels, and Under Medical Devices         Does the Patient have a Wound? Yes wound(s) were present on assessment.  LDA wound assessment was Initiated and completed by RN       Néstor Prevention initiated by RN: no    Wound Care Orders initiated by RN: No    Pressure Injury (Stage 3,4, Unstageable, DTI, NWPT, and Complex wounds) if present, place Wound referral order by RN under : No    New Ostomies, if present place, Ostomy referral order under : No     Nurse 1 eSignature: Electronically signed by Lela Yañez RN on 11/3/23 at 7:34 PM EDT    **SHARE this note so that the co-signing nurse can place an eSignature**    Nurse 2 eSignature: {Esignature:[id number]}

## 2023-11-03 NOTE — H&P
Addendum    History and physical as per office notes from 10/17/2023  No new complaints or changes in history or physical exam    Plan  Pacemaker generator upgrade to CRT-P with addition of a new coronary sinus lead. Procedure again discussed with patient and family.   They understand and are agreeable to proceed

## 2023-11-03 NOTE — DISCHARGE INSTRUCTIONS
[facility] ([facility]) Pacemaker Generator Change Patient Discharge instructions                      Medications:   Stop Coreg and start Toprol from tomorrow as prescribed  Take torsemide every other day not twice a day  NO ELIQUIS TILL MONDAY      Follow Up: You will be seen on 11/17/23 at 9:30 am at the [facility] for an incision check and brief pacemaker evaluation. If you need to reschedule this appointment, call the office at [phone]. Incision Care:  Please leave the current brown Aquacel dressing on for 1 week. REMOVE the Aquacel dressing on Friday 11/10/23. Do not remove the steri strips from your incision, they will either fall off on their own or be removed at your follow up appointment. Keep the incision dry. You may shower; but do not let the water run directly on the incision for 1 week. Check the area daily. Notify the office at [phone] if you develop any redness, swelling, drainage, warmth, or fever greater than 100 degrees. No lotions, powders, or creams to site. Activity: You may continue regular daily activities tomorrow. You also may shower starting tomorrow: but do not let the water run on the incision for one week. The dressing is waterproof. ID Card: You will have a temporary ID card until a permanent card is sent to you by the device company. The permanent card will look like a 's license or credit card and should arrive within 8 weeks. Carry your ID card with you at all times.     [address] [place] [address]  [phone]     If no answer at the above number please call [phone] for assistance

## 2023-11-03 NOTE — ANESTHESIA POSTPROCEDURE EVALUATION
Department of Anesthesiology  Postprocedure Note    Patient: Mirna York  MRN: [id number]  YOB: 1935  Date of evaluation: 11/3/2023      Procedure Summary     Date: 11/03/23 Room / Location: [facility]    Anesthesia Start: 1244 Anesthesia Stop: 7711    Procedure: PACEMAKER GENERATOR CHANGE W ANESTHESIA Diagnosis:       Breakdown (mechanical) of cardiac pulse generator (battery), initial encounter      Paroxysmal atrial fibrillation      Pacemaker    Scheduled Providers: ZAHRAA Jung CRNA;  Frank Vernon MD Responsible Provider: Frank Vernon MD    Anesthesia Type: MAC ASA Status: 4          Anesthesia Type: MAC    Clare Phase I:      Clare Phase II:        Anesthesia Post Evaluation    Patient location during evaluation: PACU  Patient participation: complete - patient participated  Level of consciousness: awake and alert  Airway patency: patent  Nausea & Vomiting: no nausea and no vomiting  Complications: no  Cardiovascular status: blood pressure returned to baseline and hemodynamically stable  Respiratory status: acceptable and spontaneous ventilation  Hydration status: euvolemic  Multimodal analgesia pain management approach  Pain management: adequate

## 2023-11-04 ENCOUNTER — APPOINTMENT (OUTPATIENT)
Dept: GENERAL RADIOLOGY | Age: 88
End: 2023-11-04
Attending: INTERNAL MEDICINE
Payer: MEDICARE

## 2023-11-04 VITALS
SYSTOLIC BLOOD PRESSURE: 126 MMHG | RESPIRATION RATE: 18 BRPM | WEIGHT: 154.32 LBS | BODY MASS INDEX: 28.23 KG/M2 | OXYGEN SATURATION: 98 % | HEART RATE: 70 BPM | DIASTOLIC BLOOD PRESSURE: 65 MMHG | TEMPERATURE: 97.4 F

## 2023-11-04 PROBLEM — I48.92 ATRIAL FLUTTER (HCC): Status: ACTIVE | Noted: 2023-11-04

## 2023-11-04 PROBLEM — T82.111A: Status: ACTIVE | Noted: 2023-11-04

## 2023-11-04 LAB
ANION GAP SERPL CALCULATED.3IONS-SCNC: 11 MMOL/L (ref 7–16)
BUN SERPL-MCNC: 40 MG/DL (ref 6–23)
CALCIUM SERPL-MCNC: 9.4 MG/DL (ref 8.6–10.2)
CHLORIDE SERPL-SCNC: 104 MMOL/L (ref 98–107)
CO2 SERPL-SCNC: 22 MMOL/L (ref 22–29)
CREAT SERPL-MCNC: 1.4 MG/DL (ref 0.5–1)
EKG ATRIAL RATE: 227 BPM
EKG ATRIAL RATE: 280 BPM
EKG Q-T INTERVAL: 466 MS
EKG Q-T INTERVAL: 470 MS
EKG QRS DURATION: 178 MS
EKG QRS DURATION: 180 MS
EKG QTC CALCULATION (BAZETT): 503 MS
EKG QTC CALCULATION (BAZETT): 507 MS
EKG R AXIS: -53 DEGREES
EKG R AXIS: 141 DEGREES
EKG T AXIS: 34 DEGREES
EKG T AXIS: 41 DEGREES
EKG VENTRICULAR RATE: 70 BPM
EKG VENTRICULAR RATE: 70 BPM
ERYTHROCYTE [DISTWIDTH] IN BLOOD BY AUTOMATED COUNT: 13 % (ref 11.5–15)
GFR SERPL CREATININE-BSD FRML MDRD: 35 ML/MIN/1.73M2
GLUCOSE SERPL-MCNC: 120 MG/DL (ref 74–99)
HCT VFR BLD AUTO: 35.2 % (ref 34–48)
HGB BLD-MCNC: 11.5 G/DL (ref 11.5–15.5)
MCH RBC QN AUTO: 29.6 PG (ref 26–35)
MCHC RBC AUTO-ENTMCNC: 32.7 G/DL (ref 32–34.5)
MCV RBC AUTO: 90.5 FL (ref 80–99.9)
PLATELET # BLD AUTO: 233 K/UL (ref 130–450)
PMV BLD AUTO: 10.5 FL (ref 7–12)
POTASSIUM SERPL-SCNC: 4.1 MMOL/L (ref 3.5–5)
RBC # BLD AUTO: 3.89 M/UL (ref 3.5–5.5)
SODIUM SERPL-SCNC: 137 MMOL/L (ref 132–146)
WBC OTHER # BLD: 7.9 K/UL (ref 4.5–11.5)

## 2023-11-04 PROCEDURE — 6370000000 HC RX 637 (ALT 250 FOR IP): Performed by: INTERNAL MEDICINE

## 2023-11-04 PROCEDURE — G0378 HOSPITAL OBSERVATION PER HR: HCPCS

## 2023-11-04 PROCEDURE — 71046 X-RAY EXAM CHEST 2 VIEWS: CPT

## 2023-11-04 PROCEDURE — 93005 ELECTROCARDIOGRAM TRACING: CPT | Performed by: INTERNAL MEDICINE

## 2023-11-04 PROCEDURE — 93281 PM DEVICE PROGR EVAL MULTI: CPT | Performed by: INTERNAL MEDICINE

## 2023-11-04 PROCEDURE — 99233 SBSQ HOSP IP/OBS HIGH 50: CPT | Performed by: INTERNAL MEDICINE

## 2023-11-04 PROCEDURE — 2580000003 HC RX 258: Performed by: INTERNAL MEDICINE

## 2023-11-04 PROCEDURE — 36415 COLL VENOUS BLD VENIPUNCTURE: CPT

## 2023-11-04 PROCEDURE — 6360000002 HC RX W HCPCS: Performed by: INTERNAL MEDICINE

## 2023-11-04 PROCEDURE — 80048 BASIC METABOLIC PNL TOTAL CA: CPT

## 2023-11-04 PROCEDURE — 85027 COMPLETE CBC AUTOMATED: CPT

## 2023-11-04 RX ORDER — DOXYCYCLINE HYCLATE 100 MG
100 TABLET ORAL 2 TIMES DAILY
Qty: 14 TABLET | Refills: 0 | Status: SHIPPED | OUTPATIENT
Start: 2023-11-04 | End: 2023-11-11

## 2023-11-04 RX ORDER — METOPROLOL SUCCINATE 25 MG/1
25 TABLET, EXTENDED RELEASE ORAL DAILY
Status: DISCONTINUED | OUTPATIENT
Start: 2023-11-04 | End: 2023-11-04 | Stop reason: HOSPADM

## 2023-11-04 RX ORDER — TORSEMIDE 20 MG/1
20 TABLET ORAL EVERY OTHER DAY
Qty: 60 TABLET | Refills: 3 | Status: SHIPPED
Start: 2023-11-04

## 2023-11-04 RX ORDER — METOPROLOL SUCCINATE 25 MG/1
25 TABLET, EXTENDED RELEASE ORAL DAILY
Qty: 90 TABLET | Refills: 3 | Status: SHIPPED | OUTPATIENT
Start: 2023-11-04

## 2023-11-04 RX ADMIN — METOPROLOL SUCCINATE 25 MG: 25 TABLET, EXTENDED RELEASE ORAL at 12:23

## 2023-11-04 RX ADMIN — PANTOPRAZOLE SODIUM 40 MG: 40 TABLET, DELAYED RELEASE ORAL at 07:48

## 2023-11-04 RX ADMIN — ASPIRIN 81 MG: 81 TABLET, COATED ORAL at 08:59

## 2023-11-04 RX ADMIN — WATER 1000 MG: 1 INJECTION INTRAMUSCULAR; INTRAVENOUS; SUBCUTANEOUS at 08:51

## 2023-11-04 RX ADMIN — DULOXETINE HYDROCHLORIDE 60 MG: 60 CAPSULE, DELAYED RELEASE ORAL at 08:59

## 2023-11-04 RX ADMIN — LISINOPRIL 2.5 MG: 5 TABLET ORAL at 08:59

## 2023-11-04 RX ADMIN — SODIUM CHLORIDE, PRESERVATIVE FREE 10 ML: 5 INJECTION INTRAVENOUS at 09:04

## 2023-11-04 RX ADMIN — LEVOTHYROXINE SODIUM 88 MCG: 0.09 TABLET ORAL at 07:48

## 2023-11-04 NOTE — PLAN OF CARE
Problem: Discharge Planning  Goal: Discharge to home or other facility with appropriate resources  Outcome: Progressing  Flowsheets  Taken 11/3/2023 1804 by Shannon Vera RN  Discharge to home or other facility with appropriate resources: Identify barriers to discharge with patient and caregiver  Taken 11/3/2023 1731 by Shannon Vera RN  Discharge to home or other facility with appropriate resources: Identify barriers to discharge with patient and caregiver

## 2023-11-04 NOTE — PROCEDURES
noninvasive blood pressure and heart rate monitoring were  instituted, the right femoral vein was accessed. Using 1% lidocaine for  local anesthesia, a 6-Beninese introducer was placed in the right femoral  vein to place a temporary pacemaker in the right ventricle. We then turned our attention to the pacemaker generator in the left  pectoral region. The area was prepped and draped in the usual sterile  manner. Using 1% lidocaine again for local anesthesia as well as with  the help of sedation with our anesthesia team, an incision was made near  the original pacemaker implant site. Using blunt dissection, the pocket  was opened and the device and leads were exposed. The leads were freed  from the underlying tissues and the pacemaker and leads were placed on  the outside of the pocket. A subclavian venogram was obtained to verify  the patency of the subclavian venous system. The vein was accessed and  a glidewire was placed into the IVC. This was then utilized to place a  9.5-Beninese introducer into the subclavian venous system. This was then  utilized to place a [employer] guiding catheter into the coronary  sinus. A decapolar catheter was utilized to isolate the coronary sinus. A coronary sinus venogram was obtained and initially I attempted to  cannulate the midlateral branch of the coronary sinus, but was  unsuccessful. Therefore, the \"bail-out\" or middle cardiac vein was  accessed to place the new coronary sinus lead, which was a [address] [place] [address], [facility], serial number [id number]. I attempted to utilize  the [employer] lead, but was unable to do so. After placement of  the lead in a satisfactory position with a pacing threshold obtained of  1.6 V at 0.4 msec using the LV tip to CAN, the introducer and the outer  sheath were removed. The lead was then tethered to the underlying  tissues.   At this point then, the old pacemaker was removed and the new  pacemaker utilized was a [facility], serial number  [id number]. The explanted device was a [employer] [id number], serial  number [id number]. The atrial and the RV leads were as follows: The atrial  lead was a [employer] [id number], serial number [id number], and the RV lead was  a [employer] Fineline lead, model 4457, serial number [id number]. Parameters  obtained with these leads were as follows:  P waves were 3.8 mV in  atrial flutter with a pacing impedance of 426 ohms. No R waves were  measurable because of underlying complete heart block. Pacing threshold  was 0.8 V at 0.4 msec, impedance of 407 ohms. The three leads and  device were placed in the subcutaneous pocket. An antimicrobial pouch,  TYRX was placed around the device and into the pocket and the pocket  closed in multiple layers. Surgiflo hemostatic fluid was also placed in  the pocket to prevent a hematoma. The patient was awakened and returned  to her room for recovery. CONCLUSIONS:  1. Temporary pacemaker placed for backup pacing while the patient's  device generator was being upgraded. Temporary pacemaker was removed at  the end of the procedure. 2.  Placement of a new coronary sinus lead to upgrade the patient's  device to a CRT-P device. 3.  Pacemaker generator upgraded and the new generator placed was the  [employer] as noted above. PLAN:  1. Recovery in the hospital.  2.  EKG and chest x-rays today and tomorrow. 3.  Resume Eliquis in two days. 4.  Other medications will be resumed prior to admission for discharge  tomorrow morning.         Marsha Almaguer MD    D: 11/03/2023 18:30:07       T: 11/04/2023 1:32:07     /[email or web address]  Job#: [id number]     Doc#: [id number]    CC:  Marsha Almaguer MD

## 2023-11-04 NOTE — DISCHARGE SUMMARY
[address] [facility] Electrophysiology  Discharge Summary  Patient: Manohar Bernstein  Medical Record Number: [id number]  Date of Procedure:  11/4/2023  Operating Electrophysiologist: Scott Martinez MD  Primary Electrophysiologist: Scott Martinez MD  Referring Physician: German Crabtree     Admission Date:11/3/2023      Discharge Date:  11/04/2023    Patient Active Problem List   Diagnosis    Complete heart block ([address])    Systemic primary arterial hypertension    RBBB (right bundle branch block)    Hypothyroidism    Hypertension    DJD (degenerative joint disease)    Right groin pain    Tobacco abuse    Chronic obstructive pulmonary disease (HCC)    CAD (coronary artery disease) - s/p PCI - LIZZY - RCA    PVD (peripheral vascular disease) with claudication (HCC)    Pain in both feet    Swelling of lower limb    Varicose veins of both lower extremities    Venous insufficiency of leg    Hyperlipidemia    SOB (shortness of breath)    Stented coronary artery    Pacemaker end of life    Pacemaker    Senile cataract    Fatigue    Acute congestive heart failure (HCC)    Chronic pain    \    Cervicothoracic kyphosis    Neural foraminal stenosis of cervical spine    CHF (congestive heart failure), NYHA class I, acute on chronic, combined ([address])    Acute systolic CHF (congestive heart failure) (HCC)    Restless leg syndrome    A-fib ([facility])    Breakdown (mechanical) of cardiac pulse generator (battery), initial encounter          Medication List        START taking these medications      doxycycline hyclate 100 MG tablet  Commonly known as: VIBRA-TABS  Take 1 tablet by mouth 2 times daily for 7 days     metoprolol succinate 25 MG extended release tablet  Commonly known as: TOPROL XL  Take 1 tablet by mouth daily            CHANGE how you take these medications      apixaban 2.5 MG Tabs tablet  Commonly known as: Eliquis  Take 1 tablet by mouth 2 times daily  Start taking on: November 6,

## 2023-11-06 LAB
EKG ATRIAL RATE: 227 BPM
EKG ATRIAL RATE: 280 BPM
EKG Q-T INTERVAL: 466 MS
EKG Q-T INTERVAL: 470 MS
EKG QRS DURATION: 178 MS
EKG QRS DURATION: 180 MS
EKG QTC CALCULATION (BAZETT): 503 MS
EKG QTC CALCULATION (BAZETT): 507 MS
EKG R AXIS: -53 DEGREES
EKG R AXIS: 141 DEGREES
EKG T AXIS: 34 DEGREES
EKG T AXIS: 41 DEGREES
EKG VENTRICULAR RATE: 70 BPM
EKG VENTRICULAR RATE: 70 BPM

## 2023-11-06 PROCEDURE — 93010 ELECTROCARDIOGRAM REPORT: CPT | Performed by: INTERNAL MEDICINE

## 2023-11-17 ENCOUNTER — APPOINTMENT (OUTPATIENT)
Dept: GENERAL RADIOLOGY | Age: 88
End: 2023-11-17
Payer: MEDICARE

## 2023-11-17 ENCOUNTER — HOSPITAL ENCOUNTER (EMERGENCY)
Age: 88
Discharge: HOME | End: 2023-11-17
Attending: STUDENT IN AN ORGANIZED HEALTH CARE EDUCATION/TRAINING PROGRAM
Payer: MEDICARE

## 2023-11-17 ENCOUNTER — NURSE ONLY (OUTPATIENT)
Dept: NON INVASIVE DIAGNOSTICS | Age: 88
End: 2023-11-17

## 2023-11-17 VITALS
WEIGHT: 150 LBS | SYSTOLIC BLOOD PRESSURE: 126 MMHG | BODY MASS INDEX: 27.6 KG/M2 | HEART RATE: 69 BPM | RESPIRATION RATE: 16 BRPM | DIASTOLIC BLOOD PRESSURE: 58 MMHG | TEMPERATURE: 97.5 F | HEIGHT: 62 IN | OXYGEN SATURATION: 95 %

## 2023-11-17 DIAGNOSIS — R07.81 RIB PAIN ON RIGHT SIDE: Primary | ICD-10-CM

## 2023-11-17 DIAGNOSIS — Z95.0 STATUS POST BIVENTRICULAR PACEMAKER: Primary | ICD-10-CM

## 2023-11-17 LAB
ALBUMIN SERPL-MCNC: 4 G/DL (ref 3.5–5.2)
ALP SERPL-CCNC: 93 U/L (ref 35–104)
ALT SERPL-CCNC: 5 U/L (ref 0–32)
ANION GAP SERPL CALCULATED.3IONS-SCNC: 8 MMOL/L (ref 7–16)
AST SERPL-CCNC: 12 U/L (ref 0–31)
BACTERIA URNS QL MICRO: ABNORMAL
BASOPHILS # BLD: 0.07 K/UL (ref 0–0.2)
BASOPHILS NFR BLD: 1 % (ref 0–2)
BILIRUB SERPL-MCNC: 0.2 MG/DL (ref 0–1.2)
BILIRUB UR QL STRIP: NEGATIVE
BNP SERPL-MCNC: 3639 PG/ML (ref 0–450)
BUN SERPL-MCNC: 45 MG/DL (ref 6–23)
CALCIUM SERPL-MCNC: 9.8 MG/DL (ref 8.6–10.2)
CHLORIDE SERPL-SCNC: 102 MMOL/L (ref 98–107)
CLARITY UR: CLEAR
CO2 SERPL-SCNC: 28 MMOL/L (ref 22–29)
COLOR UR: YELLOW
CREAT SERPL-MCNC: 1.9 MG/DL (ref 0.5–1)
EOSINOPHIL # BLD: 1.17 K/UL (ref 0.05–0.5)
EOSINOPHILS RELATIVE PERCENT: 13 % (ref 0–6)
EPI CELLS #/AREA URNS HPF: ABNORMAL /HPF
ERYTHROCYTE [DISTWIDTH] IN BLOOD BY AUTOMATED COUNT: 13 % (ref 11.5–15)
GFR SERPL CREATININE-BSD FRML MDRD: 26 ML/MIN/1.73M2
GLUCOSE SERPL-MCNC: 104 MG/DL (ref 74–99)
GLUCOSE UR STRIP-MCNC: NEGATIVE MG/DL
HCT VFR BLD AUTO: 36.8 % (ref 34–48)
HGB BLD-MCNC: 11.8 G/DL (ref 11.5–15.5)
HGB UR QL STRIP.AUTO: NEGATIVE
IMM GRANULOCYTES # BLD AUTO: <0.03 K/UL (ref 0–0.58)
IMM GRANULOCYTES NFR BLD: 0 % (ref 0–5)
INFLUENZA A BY PCR: NOT DETECTED
INFLUENZA B BY PCR: NOT DETECTED
KETONES UR STRIP-MCNC: NEGATIVE MG/DL
LEUKOCYTE ESTERASE UR QL STRIP: ABNORMAL
LYMPHOCYTES NFR BLD: 1.94 K/UL (ref 1.5–4)
LYMPHOCYTES RELATIVE PERCENT: 21 % (ref 20–42)
MAGNESIUM SERPL-MCNC: 2.3 MG/DL (ref 1.6–2.6)
MCH RBC QN AUTO: 29.3 PG (ref 26–35)
MCHC RBC AUTO-ENTMCNC: 32.1 G/DL (ref 32–34.5)
MCV RBC AUTO: 91.3 FL (ref 80–99.9)
MONOCYTES NFR BLD: 0.58 K/UL (ref 0.1–0.95)
MONOCYTES NFR BLD: 6 % (ref 2–12)
NEUTROPHILS NFR BLD: 59 % (ref 43–80)
NEUTS SEG NFR BLD: 5.51 K/UL (ref 1.8–7.3)
NITRITE UR QL STRIP: NEGATIVE
PH UR STRIP: 5.5 [PH] (ref 5–9)
PLATELET # BLD AUTO: 351 K/UL (ref 130–450)
PMV BLD AUTO: 10.1 FL (ref 7–12)
POTASSIUM SERPL-SCNC: 5.2 MMOL/L (ref 3.5–5)
PROT SERPL-MCNC: 7.4 G/DL (ref 6.4–8.3)
PROT UR STRIP-MCNC: NEGATIVE MG/DL
RBC # BLD AUTO: 4.03 M/UL (ref 3.5–5.5)
RBC #/AREA URNS HPF: ABNORMAL /HPF
SARS-COV-2 RDRP RESP QL NAA+PROBE: NOT DETECTED
SODIUM SERPL-SCNC: 138 MMOL/L (ref 132–146)
SP GR UR STRIP: 1.01 (ref 1–1.03)
SPECIMEN DESCRIPTION: NORMAL
TROPONIN I SERPL HS-MCNC: 36 NG/L (ref 0–9)
TROPONIN I SERPL HS-MCNC: 38 NG/L (ref 0–9)
UROBILINOGEN UR STRIP-ACNC: 0.2 EU/DL (ref 0–1)
WBC #/AREA URNS HPF: ABNORMAL /HPF
WBC OTHER # BLD: 9.3 K/UL (ref 4.5–11.5)

## 2023-11-17 PROCEDURE — 80053 COMPREHEN METABOLIC PANEL: CPT

## 2023-11-17 PROCEDURE — 87502 INFLUENZA DNA AMP PROBE: CPT

## 2023-11-17 PROCEDURE — 71046 X-RAY EXAM CHEST 2 VIEWS: CPT

## 2023-11-17 PROCEDURE — 81001 URINALYSIS AUTO W/SCOPE: CPT

## 2023-11-17 PROCEDURE — 87635 SARS-COV-2 COVID-19 AMP PRB: CPT

## 2023-11-17 PROCEDURE — 84484 ASSAY OF TROPONIN QUANT: CPT

## 2023-11-17 PROCEDURE — 83735 ASSAY OF MAGNESIUM: CPT

## 2023-11-17 PROCEDURE — 85025 COMPLETE CBC W/AUTO DIFF WBC: CPT

## 2023-11-17 PROCEDURE — 99285 EMERGENCY DEPT VISIT HI MDM: CPT

## 2023-11-17 PROCEDURE — 93005 ELECTROCARDIOGRAM TRACING: CPT | Performed by: NURSE PRACTITIONER

## 2023-11-17 PROCEDURE — 83880 ASSAY OF NATRIURETIC PEPTIDE: CPT

## 2023-11-17 ASSESSMENT — LIFESTYLE VARIABLES
HOW MANY STANDARD DRINKS CONTAINING ALCOHOL DO YOU HAVE ON A TYPICAL DAY: PATIENT DOES NOT DRINK
HOW OFTEN DO YOU HAVE A DRINK CONTAINING ALCOHOL: NEVER

## 2023-11-17 ASSESSMENT — PAIN DESCRIPTION - FREQUENCY: FREQUENCY: CONTINUOUS

## 2023-11-17 ASSESSMENT — PAIN DESCRIPTION - PAIN TYPE: TYPE: ACUTE PAIN

## 2023-11-17 ASSESSMENT — PAIN DESCRIPTION - LOCATION: LOCATION: RIB CAGE

## 2023-11-17 ASSESSMENT — PAIN DESCRIPTION - ORIENTATION: ORIENTATION: RIGHT;LOWER

## 2023-11-17 ASSESSMENT — PAIN SCALES - GENERAL: PAINLEVEL_OUTOF10: 3

## 2023-11-17 NOTE — ED PROVIDER NOTES
[facility],[address]  ED  Encounter Note  Admit Date/RoomTime: 11/17/2023 10:15 AM  ED Room: 18/18     Shared ALEJA-ED Attending Visit. CC: No  HPI:  11/17/23, Time: 1:08 PM MUKESH Reyes is a 80 y.o. female presenting to the ED for right-sided rib pain, beginning 1 week ago. The complaint has been persistent, mild in severity, and worsened by nothing. Presents for complaints of pain to the right lower rib area which she states began approximately 1 week ago. She states it is now radiating into the low back area as well. States she called cardiology and was advised to come in to have evaluation. She did have a pacemaker placed 2 weeks ago. States she had a small cough this morning which is dry nonproductive. She was on antibiotics for a few days after the procedure from the pacemaker. Denies any shortness of breath or fever. ROS:   Pertinent positives and negatives are stated within HPI, all other systems reviewed and are negative.  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 Past Surgical History:  has a past surgical history that includes Tubal ligation (1963);  Colonoscopy; Endoscopy, colon, diagnostic; pacemaker

## 2023-11-18 LAB
EKG ATRIAL RATE: 326 BPM
EKG Q-T INTERVAL: 438 MS
EKG QRS DURATION: 162 MS
EKG QTC CALCULATION (BAZETT): 473 MS
EKG R AXIS: -41 DEGREES
EKG T AXIS: 47 DEGREES
EKG VENTRICULAR RATE: 70 BPM

## 2023-11-18 PROCEDURE — 93010 ELECTROCARDIOGRAM REPORT: CPT | Performed by: INTERNAL MEDICINE

## 2023-12-13 ENCOUNTER — NURSE ONLY (OUTPATIENT)
Dept: NON INVASIVE DIAGNOSTICS | Age: 88
End: 2023-12-13

## 2023-12-13 DIAGNOSIS — Z95.0 STATUS POST BIVENTRICULAR PACEMAKER: Primary | ICD-10-CM

## 2023-12-13 DIAGNOSIS — I48.0 PAROXYSMAL ATRIAL FIBRILLATION (HCC): ICD-10-CM

## 2023-12-13 DIAGNOSIS — Z95.0 PACEMAKER: ICD-10-CM

## 2023-12-13 RX ORDER — DOXYCYCLINE HYCLATE 100 MG/1
100 CAPSULE ORAL 2 TIMES DAILY
Qty: 14 CAPSULE | Refills: 0 | Status: SHIPPED | OUTPATIENT
Start: 2023-12-13

## 2023-12-13 RX ORDER — DOXYCYCLINE HYCLATE 100 MG/1
100 CAPSULE ORAL 2 TIMES DAILY
COMMUNITY
Start: 2023-10-25 | End: 2023-12-13 | Stop reason: SDUPTHER

## 2023-12-29 RX ORDER — LISINOPRIL 2.5 MG/1
2.5 TABLET ORAL DAILY
Qty: 90 TABLET | Refills: 1 | Status: SHIPPED | OUTPATIENT
Start: 2023-12-29

## 2024-01-30 PROCEDURE — 93296 REM INTERROG EVL PM/IDS: CPT | Performed by: INTERNAL MEDICINE

## 2024-01-30 PROCEDURE — 93294 REM INTERROG EVL PM/LDLS PM: CPT | Performed by: INTERNAL MEDICINE

## 2024-11-19 PROCEDURE — 93294 REM INTERROG EVL PM/LDLS PM: CPT | Performed by: INTERNAL MEDICINE

## 2024-11-19 PROCEDURE — 93296 REM INTERROG EVL PM/IDS: CPT | Performed by: INTERNAL MEDICINE

## 2025-02-20 PROCEDURE — 93296 REM INTERROG EVL PM/IDS: CPT | Performed by: INTERNAL MEDICINE

## 2025-02-20 PROCEDURE — 93294 REM INTERROG EVL PM/LDLS PM: CPT | Performed by: INTERNAL MEDICINE

## 2025-07-16 NOTE — TELEPHONE ENCOUNTER
----- Message from Indira Del Rio MD sent at 5/25/2021  6:50 PM EDT -----  Danielle Mack will need Lexiscan stress test due to nonsustained ventricular tachycardia noted on pacemaker interrogationThank you ambulatory

## 2025-08-19 ENCOUNTER — TELEPHONE (OUTPATIENT)
Dept: CARDIOLOGY CLINIC | Age: 89
End: 2025-08-19